# Patient Record
Sex: MALE | Race: WHITE | NOT HISPANIC OR LATINO | Employment: OTHER | URBAN - METROPOLITAN AREA
[De-identification: names, ages, dates, MRNs, and addresses within clinical notes are randomized per-mention and may not be internally consistent; named-entity substitution may affect disease eponyms.]

---

## 2017-04-27 ENCOUNTER — HISTORICAL (OUTPATIENT)
Dept: INTENSIVE CARE | Facility: HOSPITAL | Age: 62
End: 2017-04-27

## 2017-06-22 ENCOUNTER — HISTORICAL (OUTPATIENT)
Dept: INTENSIVE CARE | Facility: HOSPITAL | Age: 62
End: 2017-06-22

## 2017-10-16 ENCOUNTER — HISTORICAL (OUTPATIENT)
Dept: RESPIRATORY THERAPY | Facility: HOSPITAL | Age: 62
End: 2017-10-16

## 2019-07-17 ENCOUNTER — HISTORICAL (OUTPATIENT)
Dept: ADMINISTRATIVE | Facility: HOSPITAL | Age: 64
End: 2019-07-17

## 2019-10-28 ENCOUNTER — HISTORICAL (OUTPATIENT)
Dept: LAB | Facility: HOSPITAL | Age: 64
End: 2019-10-28

## 2019-10-28 LAB
ALBUMIN SERPL-MCNC: 4.3 GM/DL (ref 3.4–5)
ALBUMIN/GLOB SERPL: 1.5 RATIO (ref 1.1–2)
ALP SERPL-CCNC: 59 UNIT/L (ref 50–136)
ALT SERPL-CCNC: 28 UNIT/L (ref 12–78)
AST SERPL-CCNC: 20 UNIT/L (ref 15–37)
BILIRUB SERPL-MCNC: 0.6 MG/DL (ref 0.2–1)
BILIRUBIN DIRECT+TOT PNL SERPL-MCNC: 0.1 MG/DL (ref 0–0.5)
BILIRUBIN DIRECT+TOT PNL SERPL-MCNC: 0.5 MG/DL (ref 0–0.8)
BUN SERPL-MCNC: 19 MG/DL (ref 7–18)
CALCIUM SERPL-MCNC: 9.9 MG/DL (ref 8.5–10.1)
CHLORIDE SERPL-SCNC: 106 MMOL/L (ref 98–107)
CHOLEST SERPL-MCNC: 145 MG/DL (ref 0–200)
CHOLEST/HDLC SERPL: 2.9 {RATIO} (ref 0–5)
CO2 SERPL-SCNC: 28 MMOL/L (ref 21–32)
CREAT SERPL-MCNC: 0.99 MG/DL (ref 0.7–1.3)
EST. AVERAGE GLUCOSE BLD GHB EST-MCNC: 137 MG/DL
GLOBULIN SER-MCNC: 2.9 GM/DL (ref 2.4–3.5)
GLUCOSE SERPL-MCNC: 132 MG/DL (ref 74–106)
HBA1C MFR BLD: 6.4 % (ref 4.2–6.3)
HDLC SERPL-MCNC: 50 MG/DL (ref 35–60)
LDLC SERPL CALC-MCNC: 55 MG/DL (ref 0–129)
POTASSIUM SERPL-SCNC: 4.5 MMOL/L (ref 3.5–5.1)
PROT SERPL-MCNC: 7.2 GM/DL (ref 6.4–8.2)
SODIUM SERPL-SCNC: 140 MMOL/L (ref 136–145)
TRIGL SERPL-MCNC: 200 MG/DL (ref 30–150)
VLDLC SERPL CALC-MCNC: 40 MG/DL

## 2020-01-10 ENCOUNTER — HISTORICAL (OUTPATIENT)
Dept: LAB | Facility: HOSPITAL | Age: 65
End: 2020-01-10

## 2020-01-10 LAB
EST. AVERAGE GLUCOSE BLD GHB EST-MCNC: 131 MG/DL
HBA1C MFR BLD: 6.2 % (ref 4.2–6.3)

## 2020-04-14 ENCOUNTER — HISTORICAL (OUTPATIENT)
Dept: LAB | Facility: HOSPITAL | Age: 65
End: 2020-04-14

## 2020-04-14 LAB
ABS NEUT (OLG): 8.83 X10(3)/MCL (ref 2.1–9.2)
ALBUMIN SERPL-MCNC: 4.4 GM/DL (ref 3.4–4.8)
ALBUMIN/GLOB SERPL: 1.4 RATIO (ref 1.1–2)
ALP SERPL-CCNC: 62 UNIT/L (ref 40–150)
ALT SERPL-CCNC: 25 UNIT/L (ref 0–55)
APPEARANCE, UA: CLEAR
AST SERPL-CCNC: 31 UNIT/L (ref 5–34)
BACTERIA SPEC CULT: ABNORMAL /HPF
BASOPHILS # BLD AUTO: 0.1 X10(3)/MCL (ref 0–0.2)
BASOPHILS NFR BLD AUTO: 1 %
BILIRUB SERPL-MCNC: 0.5 MG/DL
BILIRUB UR QL STRIP: NEGATIVE
BILIRUBIN DIRECT+TOT PNL SERPL-MCNC: 0.2 MG/DL (ref 0–0.5)
BILIRUBIN DIRECT+TOT PNL SERPL-MCNC: 0.3 MG/DL (ref 0–0.8)
BUN SERPL-MCNC: 17 MG/DL (ref 8.4–25.7)
CALCIUM SERPL-MCNC: 10 MG/DL (ref 8.8–10)
CHLORIDE SERPL-SCNC: 101 MMOL/L (ref 98–107)
CHOLEST SERPL-MCNC: 139 MG/DL
CHOLEST/HDLC SERPL: 3 {RATIO} (ref 0–5)
CO2 SERPL-SCNC: 28 MMOL/L (ref 23–31)
COLOR UR: YELLOW
CREAT SERPL-MCNC: 1.11 MG/DL (ref 0.73–1.18)
CREAT UR-MCNC: 149.8 MG/DL (ref 58–161)
EOSINOPHIL # BLD AUTO: 0.3 X10(3)/MCL (ref 0–0.9)
EOSINOPHIL NFR BLD AUTO: 2 %
ERYTHROCYTE [DISTWIDTH] IN BLOOD BY AUTOMATED COUNT: 12.6 % (ref 11.5–17)
EST. AVERAGE GLUCOSE BLD GHB EST-MCNC: 125.5 MG/DL
GLOBULIN SER-MCNC: 3.2 GM/DL (ref 2.4–3.5)
GLUCOSE (UA): NEGATIVE
GLUCOSE SERPL-MCNC: 118 MG/DL (ref 82–115)
HBA1C MFR BLD: 6 %
HCT VFR BLD AUTO: 47 % (ref 42–52)
HDLC SERPL-MCNC: 50 MG/DL (ref 35–60)
HGB BLD-MCNC: 15.8 GM/DL (ref 14–18)
HGB UR QL STRIP: NEGATIVE
KETONES UR QL STRIP: ABNORMAL
LDLC SERPL CALC-MCNC: 53 MG/DL (ref 50–140)
LEUKOCYTE ESTERASE UR QL STRIP: NEGATIVE
LYMPHOCYTES # BLD AUTO: 3.2 X10(3)/MCL (ref 0.6–4.6)
LYMPHOCYTES NFR BLD AUTO: 23 %
MCH RBC QN AUTO: 31.6 PG (ref 27–31)
MCHC RBC AUTO-ENTMCNC: 33.6 GM/DL (ref 33–36)
MCV RBC AUTO: 94 FL (ref 80–94)
MICROALBUMIN UR-MCNC: 22.2 UG/ML
MICROALBUMIN/CREAT RATIO PNL UR: 14.8 MG/GM CR (ref 0–30)
MONOCYTES # BLD AUTO: 1 X10(3)/MCL (ref 0.1–1.3)
MONOCYTES NFR BLD AUTO: 8 %
NEUTROPHILS # BLD AUTO: 8.83 X10(3)/MCL (ref 2.1–9.2)
NEUTROPHILS NFR BLD AUTO: 66 %
NITRITE UR QL STRIP: NEGATIVE
PH UR STRIP: 5.5 [PH] (ref 5–9)
PLATELET # BLD AUTO: 273 X10(3)/MCL (ref 130–400)
PMV BLD AUTO: 10.1 FL (ref 9.4–12.4)
POTASSIUM SERPL-SCNC: 4 MMOL/L (ref 3.5–5.1)
PROT SERPL-MCNC: 7.6 GM/DL (ref 5.8–7.6)
PROT UR QL STRIP: NEGATIVE
PSA SERPL-MCNC: 4.56 NG/ML
RBC # BLD AUTO: 5 X10(6)/MCL (ref 4.7–6.1)
RBC #/AREA URNS HPF: ABNORMAL /[HPF]
SODIUM SERPL-SCNC: 141 MMOL/L (ref 136–145)
SP GR UR STRIP: 1.02 (ref 1–1.03)
SQUAMOUS EPITHELIAL, UA: ABNORMAL
TRIGL SERPL-MCNC: 180 MG/DL (ref 34–140)
UROBILINOGEN UR STRIP-ACNC: 0.2
VLDLC SERPL CALC-MCNC: 36 MG/DL
WBC # SPEC AUTO: 13.5 X10(3)/MCL (ref 4.5–11.5)
WBC #/AREA URNS HPF: ABNORMAL /HPF

## 2020-08-17 ENCOUNTER — HISTORICAL (OUTPATIENT)
Dept: ADMINISTRATIVE | Facility: HOSPITAL | Age: 65
End: 2020-08-17

## 2020-08-17 LAB
ALBUMIN SERPL-MCNC: 4.4 GM/DL (ref 3.4–5)
ALBUMIN/GLOB SERPL: 1.6 RATIO (ref 1.1–2)
ALP SERPL-CCNC: 57 UNIT/L (ref 40–150)
ALT SERPL-CCNC: 23 UNIT/L (ref 0–55)
AST SERPL-CCNC: 21 UNIT/L (ref 5–34)
BILIRUB SERPL-MCNC: 0.6 MG/DL
BILIRUBIN DIRECT+TOT PNL SERPL-MCNC: 0.3 MG/DL (ref 0–0.5)
BILIRUBIN DIRECT+TOT PNL SERPL-MCNC: 0.3 MG/DL (ref 0–0.8)
BUN SERPL-MCNC: 17.1 MG/DL (ref 8.4–25.7)
CALCIUM SERPL-MCNC: 8.9 MG/DL (ref 8.8–10)
CHLORIDE SERPL-SCNC: 99 MMOL/L (ref 98–107)
CHOLEST SERPL-MCNC: 139 MG/DL
CHOLEST/HDLC SERPL: 2 {RATIO} (ref 0–5)
CO2 SERPL-SCNC: 28 MMOL/L (ref 23–31)
CREAT SERPL-MCNC: 1.4 MG/DL (ref 0.73–1.18)
EST. AVERAGE GLUCOSE BLD GHB EST-MCNC: 131.2 MG/DL
GLOBULIN SER-MCNC: 2.8 GM/DL (ref 2.4–3.5)
GLUCOSE SERPL-MCNC: 104 MG/DL (ref 82–115)
HBA1C MFR BLD: 6.2 %
HDLC SERPL-MCNC: 56 MG/DL (ref 35–60)
LDLC SERPL CALC-MCNC: 60 MG/DL (ref 50–140)
POTASSIUM SERPL-SCNC: 5.1 MMOL/L (ref 3.5–5.1)
PROT SERPL-MCNC: 7.2 GM/DL (ref 5.8–7.6)
SODIUM SERPL-SCNC: 140 MMOL/L (ref 136–145)
TRIGL SERPL-MCNC: 114 MG/DL (ref 34–140)
VLDLC SERPL CALC-MCNC: 23 MG/DL

## 2021-04-20 ENCOUNTER — HISTORICAL (OUTPATIENT)
Dept: ADMINISTRATIVE | Facility: HOSPITAL | Age: 66
End: 2021-04-20

## 2021-04-20 LAB
ABS NEUT (OLG): 8.93 X10(3)/MCL (ref 2.1–9.2)
ALBUMIN SERPL-MCNC: 4.3 GM/DL (ref 3.4–4.8)
ALBUMIN/GLOB SERPL: 1.7 RATIO (ref 1.1–2)
ALP SERPL-CCNC: 78 UNIT/L (ref 40–150)
ALT SERPL-CCNC: 11 UNIT/L (ref 0–55)
APPEARANCE, UA: CLEAR
AST SERPL-CCNC: 15 UNIT/L (ref 5–34)
BACTERIA #/AREA URNS AUTO: ABNORMAL /HPF
BASOPHILS # BLD AUTO: 0.1 X10(3)/MCL (ref 0–0.2)
BASOPHILS NFR BLD AUTO: 0 %
BILIRUB SERPL-MCNC: 0.6 MG/DL
BILIRUB UR QL STRIP: NEGATIVE
BILIRUBIN DIRECT+TOT PNL SERPL-MCNC: 0.3 MG/DL (ref 0–0.5)
BILIRUBIN DIRECT+TOT PNL SERPL-MCNC: 0.3 MG/DL (ref 0–0.8)
BUN SERPL-MCNC: 12.4 MG/DL (ref 8.4–25.7)
CALCIUM SERPL-MCNC: 9.8 MG/DL (ref 8.8–10)
CHLORIDE SERPL-SCNC: 102 MMOL/L (ref 98–107)
CHOLEST SERPL-MCNC: 161 MG/DL
CHOLEST/HDLC SERPL: 3 {RATIO} (ref 0–5)
CO2 SERPL-SCNC: 29 MMOL/L (ref 23–31)
COLOR UR: YELLOW
CREAT SERPL-MCNC: 1.13 MG/DL (ref 0.73–1.18)
CREAT UR-MCNC: 205.2 MG/DL (ref 58–161)
EOSINOPHIL # BLD AUTO: 0.1 X10(3)/MCL (ref 0–0.9)
EOSINOPHIL NFR BLD AUTO: 1 %
ERYTHROCYTE [DISTWIDTH] IN BLOOD BY AUTOMATED COUNT: 12.9 % (ref 11.5–17)
EST. AVERAGE GLUCOSE BLD GHB EST-MCNC: 122.6 MG/DL
GLOBULIN SER-MCNC: 2.6 GM/DL (ref 2.4–3.5)
GLUCOSE (UA): NEGATIVE
GLUCOSE SERPL-MCNC: 85 MG/DL (ref 82–115)
HBA1C MFR BLD: 5.9 %
HCT VFR BLD AUTO: 47.5 % (ref 42–52)
HDLC SERPL-MCNC: 55 MG/DL (ref 35–60)
HGB BLD-MCNC: 16.4 GM/DL (ref 14–18)
HGB UR QL STRIP: NEGATIVE
KETONES UR QL STRIP: ABNORMAL
LDLC SERPL CALC-MCNC: 87 MG/DL (ref 50–140)
LEUKOCYTE ESTERASE UR QL STRIP: NEGATIVE
LYMPHOCYTES # BLD AUTO: 1.9 X10(3)/MCL (ref 0.6–4.6)
LYMPHOCYTES NFR BLD AUTO: 16 %
MCH RBC QN AUTO: 31.2 PG (ref 27–31)
MCHC RBC AUTO-ENTMCNC: 34.5 GM/DL (ref 33–36)
MCV RBC AUTO: 90.5 FL (ref 80–94)
MICROALBUMIN UR-MCNC: 241.4 UG/ML
MICROALBUMIN/CREAT RATIO PNL UR: 117.6 MG/GM CR (ref 0–30)
MONOCYTES # BLD AUTO: 0.9 X10(3)/MCL (ref 0.1–1.3)
MONOCYTES NFR BLD AUTO: 7 %
NEUTROPHILS # BLD AUTO: 8.93 X10(3)/MCL (ref 2.1–9.2)
NEUTROPHILS NFR BLD AUTO: 75 %
NITRITE UR QL STRIP.AUTO: NEGATIVE
PH UR STRIP: 5 [PH] (ref 5–9)
PLATELET # BLD AUTO: 267 X10(3)/MCL (ref 130–400)
PMV BLD AUTO: 9.4 FL (ref 9.4–12.4)
POTASSIUM SERPL-SCNC: 4.2 MMOL/L (ref 3.5–5.1)
PROT SERPL-MCNC: 6.9 GM/DL (ref 5.8–7.6)
PROT UR QL STRIP: ABNORMAL
PSA SERPL-MCNC: 9.58 NG/ML
RBC # BLD AUTO: 5.25 X10(6)/MCL (ref 4.7–6.1)
RBC #/AREA URNS HPF: ABNORMAL /[HPF]
SODIUM SERPL-SCNC: 140 MMOL/L (ref 136–145)
SP GR UR STRIP: 1.02 (ref 1–1.03)
SQUAMOUS EPITHELIAL, UA: ABNORMAL /HPF (ref 0–4)
TRIGL SERPL-MCNC: 94 MG/DL (ref 34–140)
UROBILINOGEN UR STRIP-ACNC: 0.2
VLDLC SERPL CALC-MCNC: 19 MG/DL
WBC # SPEC AUTO: 12 X10(3)/MCL (ref 4.5–11.5)
WBC #/AREA URNS AUTO: ABNORMAL /HPF (ref 0–3)

## 2021-07-21 ENCOUNTER — HISTORICAL (OUTPATIENT)
Dept: ADMINISTRATIVE | Facility: HOSPITAL | Age: 66
End: 2021-07-21

## 2021-07-21 LAB
EST. AVERAGE GLUCOSE BLD GHB EST-MCNC: 134.1 MG/DL
HBA1C MFR BLD: 6.3 %

## 2021-10-20 ENCOUNTER — HISTORICAL (OUTPATIENT)
Dept: ADMINISTRATIVE | Facility: HOSPITAL | Age: 66
End: 2021-10-20

## 2021-10-20 LAB
EST. AVERAGE GLUCOSE BLD GHB EST-MCNC: 151.3 MG/DL
HBA1C MFR BLD: 6.9 %

## 2021-10-25 ENCOUNTER — HISTORICAL (OUTPATIENT)
Dept: ADMINISTRATIVE | Facility: HOSPITAL | Age: 66
End: 2021-10-25

## 2021-10-25 LAB — POC CREATININE: 1 MG/DL (ref 0.6–1.3)

## 2022-01-24 ENCOUNTER — HISTORICAL (OUTPATIENT)
Dept: LAB | Facility: HOSPITAL | Age: 67
End: 2022-01-24

## 2022-01-24 LAB
ALBUMIN SERPL-MCNC: 4.2 GM/DL (ref 3.4–4.8)
ALBUMIN/GLOB SERPL: 1.6 RATIO (ref 1.1–2)
ALP SERPL-CCNC: 91 UNIT/L (ref 40–150)
ALT SERPL-CCNC: 15 UNIT/L (ref 0–55)
AST SERPL-CCNC: 9 UNIT/L (ref 5–34)
BILIRUB SERPL-MCNC: 0.6 MG/DL
BILIRUBIN DIRECT+TOT PNL SERPL-MCNC: 0.2 MG/DL (ref 0–0.5)
BILIRUBIN DIRECT+TOT PNL SERPL-MCNC: 0.4 MG/DL (ref 0–0.8)
BUN SERPL-MCNC: 13 MG/DL (ref 8.4–25.7)
CALCIUM SERPL-MCNC: 9.7 MG/DL (ref 8.7–10.5)
CHLORIDE SERPL-SCNC: 98 MMOL/L (ref 98–107)
CHOLEST SERPL-MCNC: 202 MG/DL
CHOLEST/HDLC SERPL: 4 {RATIO} (ref 0–5)
CO2 SERPL-SCNC: 28 MMOL/L (ref 23–31)
CREAT SERPL-MCNC: 1.04 MG/DL (ref 0.73–1.18)
EST. AVERAGE GLUCOSE BLD GHB EST-MCNC: 231.7 MG/DL
GLOBULIN SER-MCNC: 2.7 GM/DL (ref 2.4–3.5)
GLUCOSE SERPL-MCNC: 307 MG/DL (ref 82–115)
HBA1C MFR BLD: 9.7 %
HDLC SERPL-MCNC: 47 MG/DL (ref 35–60)
LDLC SERPL CALC-MCNC: 113 MG/DL (ref 50–140)
POTASSIUM SERPL-SCNC: 3.9 MMOL/L (ref 3.5–5.1)
PROT SERPL-MCNC: 6.9 GM/DL (ref 5.8–7.6)
SODIUM SERPL-SCNC: 138 MMOL/L (ref 136–145)
TRIGL SERPL-MCNC: 210 MG/DL (ref 34–140)
VLDLC SERPL CALC-MCNC: 42 MG/DL

## 2022-04-10 ENCOUNTER — HISTORICAL (OUTPATIENT)
Dept: ADMINISTRATIVE | Facility: HOSPITAL | Age: 67
End: 2022-04-10
Payer: COMMERCIAL

## 2022-04-24 VITALS
BODY MASS INDEX: 25.43 KG/M2 | SYSTOLIC BLOOD PRESSURE: 132 MMHG | DIASTOLIC BLOOD PRESSURE: 82 MMHG | HEIGHT: 67 IN | WEIGHT: 162 LBS | OXYGEN SATURATION: 97 %

## 2022-04-25 ENCOUNTER — HISTORICAL (OUTPATIENT)
Dept: LAB | Facility: HOSPITAL | Age: 67
End: 2022-04-25
Payer: COMMERCIAL

## 2022-04-25 LAB
ABS NEUT (OLG): 8.36 (ref 2.1–9.2)
ALBUMIN SERPL-MCNC: 4.3 G/DL (ref 3.4–4.8)
ALBUMIN/GLOB SERPL: 1.5 {RATIO} (ref 1.1–2)
ALP SERPL-CCNC: 84 U/L (ref 40–150)
ALT SERPL-CCNC: 16 U/L (ref 0–55)
APPEARANCE, UA: CLEAR
AST SERPL-CCNC: 13 U/L (ref 5–34)
BACTERIA SPEC CULT: NORMAL
BASOPHILS # BLD AUTO: 0.1 10*3/UL (ref 0–0.2)
BASOPHILS NFR BLD AUTO: 1 %
BILIRUB SERPL-MCNC: 0.6 MG/DL
BILIRUB UR QL STRIP: NEGATIVE
BILIRUBIN DIRECT+TOT PNL SERPL-MCNC: 0.2 (ref 0–0.5)
BILIRUBIN DIRECT+TOT PNL SERPL-MCNC: 0.4 (ref 0–0.8)
BUDDING YEAST: NORMAL
BUN SERPL-MCNC: 13.1 MG/DL (ref 8.4–25.7)
CALCIUM SERPL-MCNC: 9.5 MG/DL (ref 8.7–10.5)
CASTS, UA: NORMAL
CHLORIDE SERPL-SCNC: 103 MMOL/L (ref 98–107)
CHOLEST SERPL-MCNC: 176 MG/DL
CHOLEST/HDLC SERPL: 5 {RATIO} (ref 0–5)
CO2 SERPL-SCNC: 26 MMOL/L (ref 23–31)
COLOR UR: NORMAL
CREAT SERPL-MCNC: 1.14 MG/DL (ref 0.73–1.18)
CREAT UR-MCNC: 224.8 MG/DL (ref 58–161)
CRYSTALS: NORMAL
EOSINOPHIL # BLD AUTO: 0.2 10*3/UL (ref 0–0.9)
EOSINOPHIL NFR BLD AUTO: 1 %
ERYTHROCYTE [DISTWIDTH] IN BLOOD BY AUTOMATED COUNT: 12.8 % (ref 11.5–17)
EST. AVERAGE GLUCOSE BLD GHB EST-MCNC: 217.3 MG/DL
GLOBULIN SER-MCNC: 2.8 G/DL (ref 2.4–3.5)
GLUCOSE (UA): NEGATIVE
GLUCOSE SERPL-MCNC: 201 MG/DL (ref 82–115)
HBA1C MFR BLD: 9.2 %
HCT VFR BLD AUTO: 47.2 % (ref 42–52)
HDLC SERPL-MCNC: 38 MG/DL (ref 35–60)
HEMOLYSIS INTERF INDEX SERPL-ACNC: 5
HGB BLD-MCNC: 15.7 G/DL (ref 14–18)
HGB UR QL STRIP: NEGATIVE
ICTERIC INTERF INDEX SERPL-ACNC: 1
KETONES UR QL STRIP: NORMAL
LDLC SERPL CALC-MCNC: 99 MG/DL (ref 50–140)
LEUKOCYTE ESTERASE UR QL STRIP: NORMAL
LIPEMIC INTERF INDEX SERPL-ACNC: 4
LYMPHOCYTES # BLD AUTO: 2.7 10*3/UL (ref 0.6–4.6)
LYMPHOCYTES NFR BLD AUTO: 21 %
MANUAL DIFF? (OHS): NO
MCH RBC QN AUTO: 30 PG (ref 27–31)
MCHC RBC AUTO-ENTMCNC: 33.3 G/DL (ref 33–36)
MCV RBC AUTO: 90.1 FL (ref 80–94)
MICROALBUMIN UR-MCNC: 121.2
MICROALBUMIN/CREAT RATIO PNL UR: 53.9 (ref 0–30)
MONOCYTES # BLD AUTO: 1 10*3/UL (ref 0.1–1.3)
MONOCYTES NFR BLD AUTO: 8 %
NEUTROPHILS # BLD AUTO: 8.36 10*3/UL (ref 2.1–9.2)
NEUTROPHILS NFR BLD AUTO: 67 %
NITRITE UR QL STRIP: NEGATIVE
PH UR STRIP: 5 [PH] (ref 5–9)
PLATELET # BLD AUTO: 349 10*3/UL (ref 130–400)
PMV BLD AUTO: 11 FL (ref 9.4–12.4)
POTASSIUM SERPL-SCNC: 4.1 MMOL/L (ref 3.5–5.1)
PROT SERPL-MCNC: 7.1 G/DL (ref 5.8–7.6)
PROT UR QL STRIP: NORMAL
PSA SERPL-MCNC: 6.03 NG/ML
RBC # BLD AUTO: 5.24 10*6/UL (ref 4.7–6.1)
RBC #/AREA URNS HPF: NORMAL /[HPF] (ref 0–2)
SMALL ROUND CELLS, UA: NORMAL
SODIUM SERPL-SCNC: 140 MMOL/L (ref 136–145)
SP GR UR STRIP: 1.02 (ref 1–1.03)
SPERM URNS QL MICRO: NORMAL
SQUAMOUS EPITHELIAL, UA: NORMAL (ref 0–4)
TRIGL SERPL-MCNC: 195 MG/DL (ref 34–140)
UROBILINOGEN UR STRIP-ACNC: 1
VLDLC SERPL CALC-MCNC: 39 MG/DL
WBC # SPEC AUTO: 12.4 10*3/UL (ref 4.5–11.5)
WBC #/AREA URNS HPF: NORMAL /[HPF] (ref 0–2)

## 2022-05-03 NOTE — HISTORICAL [facility]ER
This is a historical note converted from Ailyn. Formatting and pictures may have been removed.  Please reference Ailyn for original formatting and attached multimedia. Chief Complaint  Pt is here for right knee pain and bilateral hip pain. Pt stated he has tried therapy and it hasnt helped.  History of Present Illness  Erwin is a 63-year-old male that comes in today for evaluation of his bilateral hip pain and right knee pain.? Main reason for this visit?was to evaluate his hips. ?He has a long-standing history of bilateral hip pain. ?This is been going on for?9 to 10 years.? He has previously seen?another orthopedist and was diagnosed with avascular necrosis of both hips. ?He was treated with conservative measures such as therapy and medications.? Currently?his pain is?in the groin and worse with weightbearing.? He has no pain at rest or sitting.? He denies any history of injury.? He is also complaining of right knee pain. ?This is been present for 1 month. ?No history of injury. ?His pain is medial sided?and worse with bending going up and down stairs. ?He does admit to occasional?locking episodes. ?He denies any catching or giving way episodes.? Intermittent swelling noted as well.  Review of Systems  Systemic: No fever, no chills, and no recent weight change.  Head: No headache - frequent.  Eyes: No vision problems.  Otolarnygeal: No hearing loss, no earache, no epistaxis, no hoarseness, and no tooth pain. Gums normal.  Cardiovascular: No chest pain or discomfort and no palpitations.  Pulmonary: No pulmonary symptoms - no dyspnea, no shortness of breath  Gastrointestinal: Appetite not decreased. No dysphagia and no constant eructation. No nausea, no vomiting, no abdominal pain, no hematochezia.  Genitourinary: No genitourinary symptoms - No urinary hesitancy. No urinary loss of control - no burning sensation during urination.  Musculoskeletal: No calf muscle cramps and no localized soft tissue  swelling  Neurological: No fainting and no convulsions.  Psychological: no depression.  Skin: No rash.  Physical Exam  Vitals & Measurements  T:?36.5? ?C (Oral)? HR:?86(Peripheral)? BP:?132/68?  HT:?170?cm? WT:?73.48?kg? BMI:?25.43?  Musculoskeletal System:  right?Knee:  Knee Effusion Grade:??????????????Value????Normal Range  Grade effusion?????????????? 0  active flexion????????? 125  active extension?????0  ?Anteromedial aspect was tender on palpation.??Medial aspect was tender on palpation.??Medial collateral ligament was tender on palpation.  No lateral compartment tenderness?  _?Positive medial?Elli test  _negative lachman test  No instability on varus or valgus stress  Foot:  right?Foot: ??No excessive pronation. ??No excessive supination.  Neurological:  ??Oriented to time, place, and person.  Motor (Strength): ??No weakness of the?right knee was observed. Gait And Stance:??Limping was noted on the right.??  Skin:  ??no erythema, ecchymosis or increased heat  ?   right?knee radiographs taken in the office today show no osteoarticular abnormalities.?  ?   right hip exam  No signs of edema, erythema, or induration. No muscle atrophy seen. Tenderness to palpation over the greater trochanteric region. No tenderness over the sacroiliac joint.  Pain was elicited by active internal rotation with the hip extended. Pain was elicited by active external rotation with the hip extended. Pain was elicited by active internal rotation with the hip flexed. Pain was elicited by active external rotation with the hip flexed. Pain was elicited by active motion. Pain was elicited by passive motion. Hip was not dislocated or subluxed. No instability or laxity seen.  Passive hip abduction?30 degrees  Passive hip flexion 100 degrees  Passive internal rotation 10 degrees  Passive external rotation 30 degrees  Neurological:  Motor (Motor Strength): No weakness of the?right hip was observed.  Gait And Stance: Abnormal. An antalgic  gait was observed. limping was noted on the right.  ?   Tests  Imaging:  right?hip x-ray with two or more views of the AP and lateral aspects were performed.  Impressions  increased sclerosis and femoral head cyst seen consistent with AVN.? No femoral head collapse. no fracture or deformities seen  ?  left?hip exam  No signs of edema, erythema, or induration. No muscle atrophy seen. Tenderness to palpation over the greater trochanteric region. No tenderness over the sacroiliac joint.  Pain was elicited by active internal rotation with the hip extended. Pain was elicited by active external rotation with the hip extended. Pain was elicited by active internal rotation with the hip flexed. Pain was elicited by active external rotation with the hip flexed. Pain was elicited by active motion. Pain was elicited by passive motion. Hip was not dislocated or subluxed. No instability or laxity seen.  Passive hip abduction?30 degrees  Passive hip flexion 100 degrees  Passive internal rotation 10 degrees  Passive external rotation 30 degrees  Neurological:  Motor (Motor Strength): No weakness of the left hip was observed.  Gait And Stance: Abnormal. An antalgic gait was observed.  ?   Tests  Imaging:  left?hip x-ray with two or more views of the AP and lateral aspects were performed.  Impressions  increased sclerosis and femoral head cyst seen consistent with AVN.? No femoral head collapse. no fracture or deformities seen  Assessment/Plan  1.?Acute medial meniscus tear of right knee?S83.241A  ?Recommend MRI of the right knee to evaluate for displaced medial meniscus tear.  Ordered:  Clinic Follow up, *Est. 07/31/19 3:00:00 CDT, Order for future visit, Acute medial meniscus tear of right knee  Avascular necrosis of right femoral head  Avascular necrosis of left femoral head, [facility]  ?  2.?Avascular necrosis of right femoral head?M87.051  ?With his history of avascular necrosis recommend MRI?of the right and left hip to  evaluate degree of femoral head?AVN.? We will see him back in?1 to 2 weeks time to discuss test results  Ordered:  Clinic Follow up, *Est. 07/31/19 3:00:00 CDT, Order for future visit, Acute medial meniscus tear of right knee  Avascular necrosis of right femoral head  Avascular necrosis of left femoral head, [facility]  ?  3.?Avascular necrosis of left femoral head?M87.052  ?MRI of the left hip  Ordered:  Clinic Follow up, *Est. 07/31/19 3:00:00 CDT, Order for future visit, Acute medial meniscus tear of right knee  Avascular necrosis of right femoral head  Avascular necrosis of left femoral head, [facility]  ?  Referrals  Clinic Follow up, *Est. 07/31/19 3:00:00 CDT, Order for future visit, Acute medial meniscus tear of right knee  Avascular necrosis of right femoral head  Avascular necrosis of left femoral head, [facility]   Problem List/Past Medical History  Ongoing  Acid reflux  Acute medial meniscus tear of right knee  Anxiety  Avascular necrosis of right femoral head  Cigarette smoker  Diabetes  Hyperlipidemia  Nocturnal hypoxemia  NEAL (obstructive sleep apnea)  Tobacco user  Historical  No qualifying data  Procedure/Surgical History  Monitoring of Sleep, External Approach (06/22/2017)  Sleep study, unattended, simultaneous recording of, heart rate, oxygen saturation, respiratory airflow, and respiratory effort (eg, thoracoabdominal movement) (06/22/2017)  Monitoring of Sleep, External Approach (04/27/2017)  Sleep study, unattended, simultaneous recording of, heart rate, oxygen saturation, respiratory airflow, and respiratory effort (eg, thoracoabdominal movement) (04/27/2017)  Abdominal hernia  Tonsillectomy   Medications  aspirin 81 mg oral tablet, 81 mg= 1 tab(s), Oral, Daily  Cialis 5 mg oral tablet, 5 mg= 1 tab(s), Oral, Daily, PRN  fenofibrate 54 mg oral tablet, 54 mg= 1 tab(s), Oral, Daily  lisinopril 10 mg oral tablet, 10 mg= 1 tab(s), Oral, Daily  metformin 500 mg oral tablet, 500 mg=  1 tab(s), Oral, BID  Protonix 40 mg ORAL enteric coated tablet, 40 mg= 1 tab(s), Oral, BID  rosuvastatin 20 mg oral tablet, 20 mg= 1 tab(s), Oral, qPM  Xanax 1 mg oral tablet, 1 mg= 1 tab(s), Oral, BID  Zetia 10 mg oral tablet, 10 mg= 1 tab(s), Oral, Daily  Allergies  No Known Allergies  No Known Medication Allergies  Social History  Abuse/Neglect  No, 07/17/2019  Alcohol  Current, 3-5 times per week, 05/30/2017  Employment/School  Employed, Work/School description: [employer] 05/30/2017  Home/Environment  Lives with Spouse., 05/30/2017  Substance Use  Never, 05/13/2018  Tobacco  10 or more cigarettes (1/2 pack or more)/day in last 30 days, N/A, 07/17/2019  Family History  DIABETES: Father.  Hypertension.: Mother.  Health Maintenance  Health Maintenance  ???Pending?(in the next year)  ??? ??OverDue  ??? ? ? ?Diabetes Screening due??and every?  ??? ? ? ?Aspirin Therapy for CVD Prevention due??04/04/18??and every 1??year(s)  ??? ? ? ?Alcohol Misuse Screening due??01/01/19??and every 1??year(s)  ??? ? ? ?Smoking Cessation due??01/01/19??and every 1??year(s)  ??? ??Due?  ??? ? ? ?ADL Screening due??07/17/19??and every 1??year(s)  ??? ? ? ?Colorectal Screening due??07/17/19??and every?  ??? ? ? ?Diabetes Maintenance-Eye Exam due??07/17/19??and every?  ??? ? ? ?Diabetes Maintenance-Foot Exam due??07/17/19??and every?  ??? ? ? ?Diabetes Maintenance-HgbA1c due??07/17/19??and every?  ??? ? ? ?Diabetes Maintenance-Fasting Lipid Profile due??07/17/19??and every?  ??? ? ? ?Lung Cancer Screening due??07/17/19??and every 1??year(s)  ??? ? ? ?Tetanus Vaccine due??07/17/19??and every 10??year(s)  ??? ? ? ?Zoster Vaccine due??07/17/19??and every 100??year(s)  ??? ??Due In Future?  ??? ? ? ?Blood Pressure Screening not due until??08/09/19??and every 1??year(s)  ??? ? ? ?Body Mass Index Check not due until??08/09/19??and every 1??year(s)  ??? ? ? ?Depression Screening not due until??08/09/19??and every 1??year(s)  ??? ? ? ?Obesity  Screening not due until??01/01/20??and every 1??year(s)  ???Satisfied?(in the past 1 year)  ??? ??Satisfied?  ??? ? ? ?Blood Pressure Screening on??07/17/19.??Satisfied by Edie Ritter LPN  ??? ? ? ?Body Mass Index Check on??07/17/19.??Satisfied by Edie Ritter LPN  ??? ? ? ?Depression Screening on??08/09/18.??Satisfied by Trista Parr  ??? ? ? ?Obesity Screening on??07/17/19.??Satisfied by Edie Ritter LPN  ??? ? ? ?Smoking Cessation on??08/09/18.??Satisfied by Trista Parr  ?

## 2022-07-01 ENCOUNTER — APPOINTMENT (OUTPATIENT)
Dept: LAB | Facility: HOSPITAL | Age: 67
End: 2022-07-01
Attending: NURSE PRACTITIONER
Payer: COMMERCIAL

## 2022-07-01 DIAGNOSIS — E11.9 DIABETES MELLITUS WITHOUT COMPLICATION: Primary | ICD-10-CM

## 2022-07-01 LAB
EST. AVERAGE GLUCOSE BLD GHB EST-MCNC: 171.4 MG/DL
HBA1C MFR BLD: 7.6 %

## 2022-07-01 PROCEDURE — 36415 COLL VENOUS BLD VENIPUNCTURE: CPT

## 2022-07-01 PROCEDURE — 83036 HEMOGLOBIN GLYCOSYLATED A1C: CPT

## 2022-10-05 ENCOUNTER — LAB REQUISITION (OUTPATIENT)
Dept: LAB | Facility: HOSPITAL | Age: 67
End: 2022-10-05
Payer: COMMERCIAL

## 2022-10-05 DIAGNOSIS — E11.9 TYPE 2 DIABETES MELLITUS WITHOUT COMPLICATIONS: ICD-10-CM

## 2022-10-05 DIAGNOSIS — E78.2 MIXED HYPERLIPIDEMIA: ICD-10-CM

## 2022-10-05 LAB
ALBUMIN SERPL-MCNC: 4 GM/DL (ref 3.4–4.8)
ALBUMIN/GLOB SERPL: 1.4 RATIO (ref 1.1–2)
ALP SERPL-CCNC: 55 UNIT/L (ref 40–150)
ALT SERPL-CCNC: 8 UNIT/L (ref 0–55)
AST SERPL-CCNC: 10 UNIT/L (ref 5–34)
BILIRUBIN DIRECT+TOT PNL SERPL-MCNC: 0.2 MG/DL
BUN SERPL-MCNC: 13.9 MG/DL (ref 8.4–25.7)
CALCIUM SERPL-MCNC: 9.3 MG/DL (ref 8.8–10)
CHLORIDE SERPL-SCNC: 104 MMOL/L (ref 98–107)
CHOLEST SERPL-MCNC: 149 MG/DL
CHOLEST/HDLC SERPL: 4 {RATIO} (ref 0–5)
CO2 SERPL-SCNC: 32 MMOL/L (ref 23–31)
CREAT SERPL-MCNC: 1.08 MG/DL (ref 0.73–1.18)
EST. AVERAGE GLUCOSE BLD GHB EST-MCNC: 159.9 MG/DL
GFR SERPLBLD CREATININE-BSD FMLA CKD-EPI: >60 MLS/MIN/1.73/M2
GLOBULIN SER-MCNC: 2.9 GM/DL (ref 2.4–3.5)
GLUCOSE SERPL-MCNC: 179 MG/DL (ref 82–115)
HBA1C MFR BLD: 7.2 %
HDLC SERPL-MCNC: 34 MG/DL (ref 35–60)
LDLC SERPL CALC-MCNC: 72 MG/DL (ref 50–140)
POTASSIUM SERPL-SCNC: 4.2 MMOL/L (ref 3.5–5.1)
PROT SERPL-MCNC: 6.9 GM/DL (ref 5.8–7.6)
SODIUM SERPL-SCNC: 141 MMOL/L (ref 136–145)
TRIGL SERPL-MCNC: 213 MG/DL (ref 34–140)
VLDLC SERPL CALC-MCNC: 43 MG/DL

## 2022-10-05 PROCEDURE — 83036 HEMOGLOBIN GLYCOSYLATED A1C: CPT | Performed by: NURSE PRACTITIONER

## 2022-10-05 PROCEDURE — 80061 LIPID PANEL: CPT | Performed by: NURSE PRACTITIONER

## 2022-10-05 PROCEDURE — 80053 COMPREHEN METABOLIC PANEL: CPT | Performed by: NURSE PRACTITIONER

## 2023-01-03 DIAGNOSIS — I63.9 CEREBELLAR INFARCTION: Primary | ICD-10-CM

## 2023-01-17 RX ORDER — CARVEDILOL 6.25 MG/1
6.25 TABLET ORAL 2 TIMES DAILY WITH MEALS
COMMUNITY

## 2023-01-17 RX ORDER — ROSUVASTATIN CALCIUM 40 MG/1
10 TABLET, COATED ORAL NIGHTLY
COMMUNITY

## 2023-01-17 RX ORDER — BUPROPION HYDROCHLORIDE 150 MG/1
150 TABLET ORAL DAILY
COMMUNITY

## 2023-01-17 RX ORDER — PANTOPRAZOLE SODIUM 40 MG/1
40 TABLET, DELAYED RELEASE ORAL EVERY MORNING
COMMUNITY

## 2023-01-17 RX ORDER — FENOFIBRATE 48 MG/1
48 TABLET, FILM COATED ORAL
COMMUNITY

## 2023-01-17 RX ORDER — BUSPIRONE HYDROCHLORIDE 15 MG/1
15 TABLET ORAL 2 TIMES DAILY
COMMUNITY

## 2023-01-17 RX ORDER — PRIMIDONE 50 MG/1
50 TABLET ORAL 2 TIMES DAILY
COMMUNITY

## 2023-01-17 RX ORDER — QUETIAPINE FUMARATE 50 MG/1
50 TABLET, FILM COATED ORAL NIGHTLY
COMMUNITY

## 2023-01-17 RX ORDER — VENLAFAXINE HYDROCHLORIDE 150 MG/1
150 CAPSULE, EXTENDED RELEASE ORAL DAILY
COMMUNITY

## 2023-01-17 RX ORDER — ALPRAZOLAM 1 MG/1
1 TABLET ORAL 2 TIMES DAILY
COMMUNITY

## 2023-01-17 RX ORDER — METFORMIN HYDROCHLORIDE 1000 MG/1
1000 TABLET ORAL 2 TIMES DAILY WITH MEALS
COMMUNITY

## 2023-01-17 RX ORDER — AMLODIPINE BESYLATE 5 MG/1
5 TABLET ORAL DAILY
COMMUNITY
End: 2023-01-31 | Stop reason: DRUGHIGH

## 2023-01-30 ENCOUNTER — LAB REQUISITION (OUTPATIENT)
Dept: LAB | Facility: HOSPITAL | Age: 68
End: 2023-01-30
Payer: MEDICARE

## 2023-01-30 DIAGNOSIS — E11.9 TYPE 2 DIABETES MELLITUS WITHOUT COMPLICATIONS: ICD-10-CM

## 2023-01-30 LAB
EST. AVERAGE GLUCOSE BLD GHB EST-MCNC: 162.8 MG/DL
HBA1C MFR BLD: 7.3 %

## 2023-01-30 PROCEDURE — 83036 HEMOGLOBIN GLYCOSYLATED A1C: CPT | Performed by: INTERNAL MEDICINE

## 2023-01-31 ENCOUNTER — TELEPHONE (OUTPATIENT)
Dept: NEUROLOGY | Facility: CLINIC | Age: 68
End: 2023-01-31

## 2023-01-31 ENCOUNTER — OFFICE VISIT (OUTPATIENT)
Dept: NEUROLOGY | Facility: CLINIC | Age: 68
End: 2023-01-31
Payer: MEDICARE

## 2023-01-31 VITALS
BODY MASS INDEX: 27.32 KG/M2 | DIASTOLIC BLOOD PRESSURE: 80 MMHG | WEIGHT: 170 LBS | HEIGHT: 66 IN | SYSTOLIC BLOOD PRESSURE: 132 MMHG

## 2023-01-31 DIAGNOSIS — I63.89 OTHER CEREBRAL INFARCTION: Primary | ICD-10-CM

## 2023-01-31 DIAGNOSIS — I63.9 CEREBELLAR INFARCTION: ICD-10-CM

## 2023-01-31 PROCEDURE — 99999 PR PBB SHADOW E&M-EST. PATIENT-LVL III: ICD-10-PCS | Mod: PBBFAC,,, | Performed by: PSYCHIATRY & NEUROLOGY

## 2023-01-31 PROCEDURE — 99205 PR OFFICE/OUTPT VISIT, NEW, LEVL V, 60-74 MIN: ICD-10-PCS | Mod: S$PBB,,, | Performed by: PSYCHIATRY & NEUROLOGY

## 2023-01-31 PROCEDURE — 99999 PR PBB SHADOW E&M-EST. PATIENT-LVL III: CPT | Mod: PBBFAC,,, | Performed by: PSYCHIATRY & NEUROLOGY

## 2023-01-31 PROCEDURE — 99213 OFFICE O/P EST LOW 20 MIN: CPT | Mod: PBBFAC | Performed by: PSYCHIATRY & NEUROLOGY

## 2023-01-31 PROCEDURE — 99205 OFFICE O/P NEW HI 60 MIN: CPT | Mod: S$PBB,,, | Performed by: PSYCHIATRY & NEUROLOGY

## 2023-01-31 RX ORDER — ASPIRIN 81 MG/1
81 TABLET ORAL DAILY
COMMUNITY

## 2023-01-31 RX ORDER — AMLODIPINE BESYLATE 10 MG/1
10 TABLET ORAL
COMMUNITY
Start: 2023-01-25

## 2023-01-31 NOTE — PROGRESS NOTES
Neurology Office Visit  Neurology    Erwin JORGE Garnica is a 67 y.o. male for stroke eval.  Reports dizziness starting approx one month ago.  MRI showed R cerebellar lesion c/w infarct.  Currently receiving therapy for dizziness.  Denies prior stroke-like events.  +HTN +DM2.  Denies palpitations.      Also reports memory loss x 1 year; short-term memory; wife has to repeat questions; he forgets tasks that he has done.    ROS:  Review of Systems   All other systems reviewed and are negative.     Past Medical History:   Diagnosis Date    Anxiety     Depression     Diabetes mellitus     Dizziness     Frequent falls     Hypertension     Mixed hyperlipidemia     Nocturnal hypoxemia     Prostate cancer     Unsteady gait      Past Surgical History:   Procedure Laterality Date    HERNIA REPAIR      TONSILLECTOMY       Family History   Problem Relation Age of Onset    Heart disease Mother     Seizures Mother     Coronary artery disease Mother     Diabetes Father     Gout Father     Alcohol abuse Father      Review of patient's allergies indicates:   Allergen Reactions    Antihistamines - alkylamine      Wife states antihistamines make him very nervous and jittery        Current Outpatient Medications:     ALPRAZolam (XANAX) 1 MG tablet, Take 1 mg by mouth 2 (two) times daily., Disp: , Rfl:     amLODIPine (NORVASC) 10 MG tablet, Take 10 mg by mouth., Disp: , Rfl:     aspirin (ECOTRIN) 81 MG EC tablet, Take 81 mg by mouth once daily., Disp: , Rfl:     buPROPion (WELLBUTRIN XL) 150 MG TB24 tablet, Take 150 mg by mouth once daily., Disp: , Rfl:     busPIRone (BUSPAR) 15 MG tablet, Take 15 mg by mouth 2 (two) times daily., Disp: , Rfl:     carvediloL (COREG) 6.25 MG tablet, Take 6.25 mg by mouth 2 (two) times daily with meals., Disp: , Rfl:     fenofibrate (TRICOR) 48 MG tablet, Take 48 mg by mouth., Disp: , Rfl:     metFORMIN (GLUCOPHAGE) 1000 MG tablet, Take 1,000 mg by mouth 2 (two) times daily with meals., Disp: , Rfl:      pantoprazole (PROTONIX) 40 MG tablet, Take 40 mg by mouth once daily., Disp: , Rfl:     primidone (MYSOLINE) 50 MG Tab, Take 50 mg by mouth 2 (two) times a day., Disp: , Rfl:     QUEtiapine (SEROQUEL) 50 MG tablet, Take 50 mg by mouth nightly., Disp: , Rfl:     rosuvastatin (CRESTOR) 40 MG Tab, Take 10 mg by mouth every evening., Disp: , Rfl:     venlafaxine (EFFEXOR-XR) 150 MG Cp24, Take 150 mg by mouth once daily., Disp: , Rfl:   Outpatient Medications Marked as Taking for the 1/31/23 encounter (Office Visit) with Compa Govea MD   Medication Sig Dispense Refill    ALPRAZolam (XANAX) 1 MG tablet Take 1 mg by mouth 2 (two) times daily.      amLODIPine (NORVASC) 10 MG tablet Take 10 mg by mouth.      aspirin (ECOTRIN) 81 MG EC tablet Take 81 mg by mouth once daily.      buPROPion (WELLBUTRIN XL) 150 MG TB24 tablet Take 150 mg by mouth once daily.      busPIRone (BUSPAR) 15 MG tablet Take 15 mg by mouth 2 (two) times daily.      carvediloL (COREG) 6.25 MG tablet Take 6.25 mg by mouth 2 (two) times daily with meals.      fenofibrate (TRICOR) 48 MG tablet Take 48 mg by mouth.      metFORMIN (GLUCOPHAGE) 1000 MG tablet Take 1,000 mg by mouth 2 (two) times daily with meals.      pantoprazole (PROTONIX) 40 MG tablet Take 40 mg by mouth once daily.      primidone (MYSOLINE) 50 MG Tab Take 50 mg by mouth 2 (two) times a day.      QUEtiapine (SEROQUEL) 50 MG tablet Take 50 mg by mouth nightly.      rosuvastatin (CRESTOR) 40 MG Tab Take 10 mg by mouth every evening.      venlafaxine (EFFEXOR-XR) 150 MG Cp24 Take 150 mg by mouth once daily.      [DISCONTINUED] amLODIPine (NORVASC) 5 MG tablet Take 5 mg by mouth once daily.       Social History     Tobacco Use    Smoking status: Every Day     Packs/day: 1.00     Years: 50.00     Pack years: 50.00     Types: Cigarettes    Smokeless tobacco: Never   Substance Use Topics    Alcohol use: Yes     Comment: 2-4 times per month    Drug use: Never         Vitals:    01/31/23 100    BP: 132/80     Gen NAD  HEENT NC/AT  CV RRR, no carotid bruits  Resp clear  GI soft  Ext no C/C/E  Neuro  AAOx4  Speech fluent, appropriate  EOMI, PERRLA, VFF, no papilledema  Normal facial strength, sensation  Tongue and palate midline  Motor 5/5  Sensation intact  DTRs symmetric  Coord mild dysmetria R FNF  Gait Normal    Assessment: R Cerebellar Stroke  Memory Loss    Plan: MRI brain w/ and w/o; MRA head w/o; MRA neck w/ and w/o  Linq monitor if MRA WNL  Neuro Optimizer

## 2023-01-31 NOTE — TELEPHONE ENCOUNTER
----- Message from Keiry Collier sent at 1/31/2023  3:32 PM CST -----  Regarding: MRA Neck W WO Contrast  MRA Neck W WO Contrast needs to be reordered as MRA Neck W Contrast to be scheduled at [facility]      Thank You,Keiry Collier  Centralized Scheduling Dept

## 2023-02-02 ENCOUNTER — TELEPHONE (OUTPATIENT)
Dept: NEUROLOGY | Facility: CLINIC | Age: 68
End: 2023-02-02
Payer: MEDICARE

## 2023-02-02 DIAGNOSIS — I63.9 CEREBELLAR INFARCTION: Primary | ICD-10-CM

## 2023-02-02 DIAGNOSIS — I63.89 OTHER CEREBRAL INFARCTION: ICD-10-CM

## 2023-03-07 ENCOUNTER — APPOINTMENT (OUTPATIENT)
Dept: RADIOLOGY | Facility: HOSPITAL | Age: 68
End: 2023-03-07
Attending: PSYCHIATRY & NEUROLOGY
Payer: MEDICARE

## 2023-03-07 DIAGNOSIS — I63.89 OTHER CEREBRAL INFARCTION: ICD-10-CM

## 2023-03-07 LAB
CREAT SERPL-MCNC: 1.2 MG/DL (ref 0.5–1.4)
SAMPLE: NORMAL

## 2023-03-07 PROCEDURE — 70544 MR ANGIOGRAPHY HEAD W/O DYE: CPT | Mod: TC

## 2023-03-20 ENCOUNTER — OFFICE VISIT (OUTPATIENT)
Dept: NEUROLOGY | Facility: CLINIC | Age: 68
End: 2023-03-20
Payer: MEDICARE

## 2023-03-20 VITALS
DIASTOLIC BLOOD PRESSURE: 72 MMHG | BODY MASS INDEX: 27.32 KG/M2 | WEIGHT: 170 LBS | SYSTOLIC BLOOD PRESSURE: 118 MMHG | HEIGHT: 66 IN

## 2023-03-20 DIAGNOSIS — I63.9 CEREBELLAR INFARCTION: Primary | ICD-10-CM

## 2023-03-20 DIAGNOSIS — R41.3 MEMORY LOSS: ICD-10-CM

## 2023-03-20 DIAGNOSIS — R42 DIZZINESS: ICD-10-CM

## 2023-03-20 PROCEDURE — 99213 OFFICE O/P EST LOW 20 MIN: CPT | Mod: PBBFAC | Performed by: PSYCHIATRY & NEUROLOGY

## 2023-03-20 PROCEDURE — 99999 PR PBB SHADOW E&M-EST. PATIENT-LVL III: CPT | Mod: PBBFAC,,, | Performed by: PSYCHIATRY & NEUROLOGY

## 2023-03-20 PROCEDURE — 99213 PR OFFICE/OUTPT VISIT, EST, LEVL III, 20-29 MIN: ICD-10-PCS | Mod: S$PBB,,, | Performed by: PSYCHIATRY & NEUROLOGY

## 2023-03-20 PROCEDURE — 99213 OFFICE O/P EST LOW 20 MIN: CPT | Mod: S$PBB,,, | Performed by: PSYCHIATRY & NEUROLOGY

## 2023-03-20 PROCEDURE — 99999 PR PBB SHADOW E&M-EST. PATIENT-LVL III: ICD-10-PCS | Mod: PBBFAC,,, | Performed by: PSYCHIATRY & NEUROLOGY

## 2023-03-20 RX ORDER — ALBUTEROL SULFATE 90 UG/1
1 AEROSOL, METERED RESPIRATORY (INHALATION) EVERY 4 HOURS PRN
COMMUNITY
Start: 2023-03-06

## 2023-03-20 RX ORDER — EMPAGLIFLOZIN 10 MG/1
10 TABLET, FILM COATED ORAL
COMMUNITY
Start: 2023-03-02

## 2023-03-20 RX ORDER — BENZONATATE 200 MG/1
200 CAPSULE ORAL 3 TIMES DAILY
COMMUNITY
Start: 2023-03-14

## 2023-03-20 NOTE — PROGRESS NOTES
Neurology Office Visit  Neurology    Erwin Garnica is a 67 y.o. male for f/u.  C/o dizziness.  MRI brain no changes.  MRA mod left M2 stenosis.    ROS:  Review of Systems   All other systems reviewed and are negative.     Past Medical History:   Diagnosis Date    Anxiety     Depression     Diabetes mellitus     Dizziness     Frequent falls     Hypertension     Mixed hyperlipidemia     Nocturnal hypoxemia     Prostate cancer     Unsteady gait      Past Surgical History:   Procedure Laterality Date    HERNIA REPAIR      TONSILLECTOMY       Family History   Problem Relation Age of Onset    Heart disease Mother     Seizures Mother     Coronary artery disease Mother     Diabetes Father     Gout Father     Alcohol abuse Father      Review of patient's allergies indicates:   Allergen Reactions    Antihistamines - alkylamine      Wife states antihistamines make him very nervous and jittery        Current Outpatient Medications:     albuterol (PROVENTIL/VENTOLIN HFA) 90 mcg/actuation inhaler, 1 puff every 4 (four) hours as needed., Disp: , Rfl:     ALPRAZolam (XANAX) 1 MG tablet, Take 1 mg by mouth 2 (two) times daily., Disp: , Rfl:     amLODIPine (NORVASC) 10 MG tablet, Take 10 mg by mouth., Disp: , Rfl:     aspirin (ECOTRIN) 81 MG EC tablet, Take 81 mg by mouth once daily., Disp: , Rfl:     benzonatate (TESSALON) 200 MG capsule, Take 200 mg by mouth 3 (three) times daily., Disp: , Rfl:     buPROPion (WELLBUTRIN XL) 150 MG TB24 tablet, Take 150 mg by mouth once daily., Disp: , Rfl:     busPIRone (BUSPAR) 15 MG tablet, Take 15 mg by mouth 2 (two) times daily., Disp: , Rfl:     carvediloL (COREG) 6.25 MG tablet, Take 6.25 mg by mouth 2 (two) times daily with meals., Disp: , Rfl:     fenofibrate (TRICOR) 48 MG tablet, Take 48 mg by mouth., Disp: , Rfl:     JARDIANCE 10 mg tablet, Take 10 mg by mouth., Disp: , Rfl:     metFORMIN (GLUCOPHAGE) 1000 MG tablet, Take 1,000 mg by mouth 2 (two) times daily with meals., Disp: , Rfl:      pantoprazole (PROTONIX) 40 MG tablet, Take 40 mg by mouth once daily., Disp: , Rfl:     primidone (MYSOLINE) 50 MG Tab, Take 50 mg by mouth 2 (two) times a day., Disp: , Rfl:     QUEtiapine (SEROQUEL) 50 MG tablet, Take 50 mg by mouth nightly., Disp: , Rfl:     rosuvastatin (CRESTOR) 40 MG Tab, Take 10 mg by mouth every evening., Disp: , Rfl:     venlafaxine (EFFEXOR-XR) 150 MG Cp24, Take 150 mg by mouth once daily., Disp: , Rfl:   Outpatient Medications Marked as Taking for the 3/20/23 encounter (Office Visit) with Compa Govea MD   Medication Sig Dispense Refill    albuterol (PROVENTIL/VENTOLIN HFA) 90 mcg/actuation inhaler 1 puff every 4 (four) hours as needed.      ALPRAZolam (XANAX) 1 MG tablet Take 1 mg by mouth 2 (two) times daily.      amLODIPine (NORVASC) 10 MG tablet Take 10 mg by mouth.      aspirin (ECOTRIN) 81 MG EC tablet Take 81 mg by mouth once daily.      benzonatate (TESSALON) 200 MG capsule Take 200 mg by mouth 3 (three) times daily.      buPROPion (WELLBUTRIN XL) 150 MG TB24 tablet Take 150 mg by mouth once daily.      busPIRone (BUSPAR) 15 MG tablet Take 15 mg by mouth 2 (two) times daily.      carvediloL (COREG) 6.25 MG tablet Take 6.25 mg by mouth 2 (two) times daily with meals.      fenofibrate (TRICOR) 48 MG tablet Take 48 mg by mouth.      JARDIANCE 10 mg tablet Take 10 mg by mouth.      metFORMIN (GLUCOPHAGE) 1000 MG tablet Take 1,000 mg by mouth 2 (two) times daily with meals.      pantoprazole (PROTONIX) 40 MG tablet Take 40 mg by mouth once daily.      primidone (MYSOLINE) 50 MG Tab Take 50 mg by mouth 2 (two) times a day.      QUEtiapine (SEROQUEL) 50 MG tablet Take 50 mg by mouth nightly.      rosuvastatin (CRESTOR) 40 MG Tab Take 10 mg by mouth every evening.      venlafaxine (EFFEXOR-XR) 150 MG Cp24 Take 150 mg by mouth once daily.       Social History     Tobacco Use    Smoking status: Every Day     Packs/day: 0.50     Years: 50.00     Pack years: 25.00     Types: Cigarettes     Smokeless tobacco: Never   Substance Use Topics    Alcohol use: Yes     Comment: 2-4 times per month    Drug use: Never         Vitals:    03/20/23 1300   BP: 118/72     Gen NAD  HEENT NC/AT  CV RRR, no carotid bruits  Resp clear  GI soft  Ext no C/C/E  Neuro  AAOx4  Speech fluent, appropriate  EOMI, PERRLA, VFF, no papilledema  Normal facial strength, sensation  Tongue and palate midline  Motor 5/5  Sensation intact  DTRs symmetric  Coord mild dysmetria R FNF  Gait Normal    Assessment: Cerebellar Stroke  Dizziness  Memory Loss    Plan: Recommended reducing antidepressants with dizziness SEs.  They will discuss with prescribing doctor.  Continue Neuro Optimizer

## 2023-06-27 ENCOUNTER — OFFICE VISIT (OUTPATIENT)
Dept: NEUROLOGY | Facility: CLINIC | Age: 68
End: 2023-06-27
Payer: MEDICARE

## 2023-06-27 VITALS — SYSTOLIC BLOOD PRESSURE: 125 MMHG | BODY MASS INDEX: 27.44 KG/M2 | WEIGHT: 170 LBS | DIASTOLIC BLOOD PRESSURE: 86 MMHG

## 2023-06-27 DIAGNOSIS — R42 DIZZINESS: ICD-10-CM

## 2023-06-27 DIAGNOSIS — I63.9 CEREBROVASCULAR ACCIDENT (CVA), UNSPECIFIED MECHANISM: Primary | ICD-10-CM

## 2023-06-27 DIAGNOSIS — R41.89 BRAIN FOG: ICD-10-CM

## 2023-06-27 PROCEDURE — 99999 PR PBB SHADOW E&M-EST. PATIENT-LVL III: ICD-10-PCS | Mod: PBBFAC,,, | Performed by: NURSE PRACTITIONER

## 2023-06-27 PROCEDURE — 99214 PR OFFICE/OUTPT VISIT, EST, LEVL IV, 30-39 MIN: ICD-10-PCS | Mod: S$PBB,,, | Performed by: NURSE PRACTITIONER

## 2023-06-27 PROCEDURE — 99214 OFFICE O/P EST MOD 30 MIN: CPT | Mod: S$PBB,,, | Performed by: NURSE PRACTITIONER

## 2023-06-27 PROCEDURE — 99213 OFFICE O/P EST LOW 20 MIN: CPT | Mod: PBBFAC | Performed by: NURSE PRACTITIONER

## 2023-06-27 PROCEDURE — 99999 PR PBB SHADOW E&M-EST. PATIENT-LVL III: CPT | Mod: PBBFAC,,, | Performed by: NURSE PRACTITIONER

## 2023-06-27 RX ORDER — GLIMEPIRIDE 2 MG/1
2 TABLET ORAL EVERY MORNING
COMMUNITY
Start: 2023-05-31

## 2023-06-27 NOTE — PROGRESS NOTES
Subjective:      Patient ID: Erwin Garnica is a 67 y.o. male.    Chief Complaint:  Stroke (3 mos f/u . Pt states fogginess and dizziness. Denies slurred speech, blurred vision or weakness)      History of Present Illness  Patient presents for follow up. Imaging performed in March 2023; MRI brain, MRA brain, MRA neck all reviewed and normal. Some age related changes. Imaging prior to that  MRI showed R cerebellar lesion c/w infarct. Patient reports still with dizziness and memory fog. Patient was to discuss with prescriber reducing antidepressants to see if this helps with dizziness. Saw Doe Kendrick, psych NP shortly after seeing Dr. Govea. Patient reports he tried to get off his antidepressants but had poor results so he began taking medication again. Patient is taking Primidone for essential tremor twice a day which is controlling tremors for the most part. Reports a fall 2-3 weeks ago. Was trying to put his pants on and he lost his balance and hit his elbow on the tile at his house. Has worked with [facility] about 6 months ago and did not see any improvement in his dizziness or balance.    MRI BRAIN W WO CONTRAST     CLINICAL HISTORY:  Stroke, follow up; Other cerebral infarction     TECHNIQUE:  Multiplanar multisequence MR imaging of the brain was performed without and with contrast.     COMPARISON:  None     FINDINGS:  No intracranial mass or lesion is seen.  No hemorrhage is seen.  No acute infarct is seen.  No diffusion abnormality seen.  There is some cerebral atrophy seen along with some compensatory ventricular dilatation and periventricular white matter change consistent with the patient's age.  Posterior fossa appears normal.  Postcontrast imaging demonstrates no evidence of abnormal enhancement.  No mass is seen.  No vascular enhancement seen.  No dural enhancement is seen.  Calvarium is intact.  There is evidence of pansinusitis.     Impression:     Chronic age related changes      Pansinusitis        Electronically signed by: Tejal Moreland  Date:                                            03/07/2023  Time:                                           15:11    MRA BRAIN WITHOUT CONTRAST     CLINICAL HISTORY:  Stroke, follow up;Other cerebral infarction     TECHNIQUE:  3-D time of flight MR sequences were performed through the [facility] without administration of contrast.     COMPARISON:  None available.     FINDINGS:  The cavernous and supraclinoid portion of the right internal carotid artery are unremarkable.  There is mild narrowing of the supraclinoid portion of the left internal carotid artery.  Right middle cerebral artery, bilateral anterior cerebral arteries and the major branches are widely patent without significant stenosis.  There is mild focal narrowing of the proximal portion of the A1 segment of the left anterior cerebral artery.  There is developmental short M1 segment of the left middle cerebral artery.  The superior division of the left middle cerebral artery shows moderate focal narrowing with unremarkable flow distally.     There is unremarkable flow within the visualized portion of the vertebral arteries.  The basilar artery is again patent.  Flow is seen bilaterally within the posterior cerebral arteries.     Impression:     1.  Left middle cerebral artery superior division focal moderate narrowing.     2.  Otherwise, no hemodynamically significant flow-limiting stenosis identified.        Electronically signed by: Alejandro Ny  Date:                                            03/07/2023  Time:                                           16:06    MRA NECK WITH CONTRAST     CLINICAL HISTORY:  Stroke/TIA, determine embolic source;Cerebral infarction, unspecified     TECHNIQUE:  MR Angiogram study of the neck was performed following intravenous contrast administration.     COMPARISON:  None available     FINDINGS:  The aortic arch is without aneurysmal dilatation.   Brachiocephalic trunk and the visualized subclavian arteries are without significant stenosis.  There are artifacts which degrade images of proximal portion of the right common carotid artery.  Otherwise, there is unremarkable caliber, contours and enhancement of the bilateral common carotid arteries, internal and external carotid arteries without flow limiting stenosis or aneurysmal dilatation.  There is also unremarkable flow bilaterally within the vertebral arteries.     Impression:     No hemodynamically significant flow-limiting stenosis identified.        Electronically signed by: Alejandro Ny  Date:                                            03/07/2023  Time:                                           15:58    Past Medical History:   Diagnosis Date    Anxiety     Depression     Diabetes mellitus     Dizziness     Frequent falls     Hypertension     Mixed hyperlipidemia     Nocturnal hypoxemia     Prostate cancer     Unsteady gait        Past Surgical History:   Procedure Laterality Date    HERNIA REPAIR      TONSILLECTOMY         Family History   Problem Relation Age of Onset    Heart disease Mother     Seizures Mother     Coronary artery disease Mother     Diabetes Father     Gout Father     Alcohol abuse Father        Social History     Socioeconomic History    Marital status:    Tobacco Use    Smoking status: Every Day     Packs/day: 0.50     Years: 50.00     Pack years: 25.00     Types: Cigarettes    Smokeless tobacco: Never   Substance and Sexual Activity    Alcohol use: Yes     Comment: 2-4 times per month    Drug use: Never    Sexual activity: Yes     Partners: Female       Current Outpatient Medications   Medication Sig Dispense Refill    albuterol (PROVENTIL/VENTOLIN HFA) 90 mcg/actuation inhaler 1 puff every 4 (four) hours as needed.      ALPRAZolam (XANAX) 1 MG tablet Take 1 mg by mouth 2 (two) times daily.      amLODIPine (NORVASC) 10 MG tablet Take 10 mg by mouth.      aspirin (ECOTRIN) 81  MG EC tablet Take 81 mg by mouth once daily.      benzonatate (TESSALON) 200 MG capsule Take 200 mg by mouth 3 (three) times daily.      buPROPion (WELLBUTRIN XL) 150 MG TB24 tablet Take 150 mg by mouth once daily.      busPIRone (BUSPAR) 15 MG tablet Take 15 mg by mouth 2 (two) times daily.      carvediloL (COREG) 6.25 MG tablet Take 6.25 mg by mouth 2 (two) times daily with meals.      fenofibrate (TRICOR) 48 MG tablet Take 48 mg by mouth.      glimepiride (AMARYL) 2 MG tablet Take 2 mg by mouth every morning.      JARDIANCE 10 mg tablet Take 10 mg by mouth.      metFORMIN (GLUCOPHAGE) 1000 MG tablet Take 1,000 mg by mouth 2 (two) times daily with meals.      pantoprazole (PROTONIX) 40 MG tablet Take 40 mg by mouth once daily.      primidone (MYSOLINE) 50 MG Tab Take 50 mg by mouth 2 (two) times a day.      QUEtiapine (SEROQUEL) 50 MG tablet Take 50 mg by mouth nightly.      rosuvastatin (CRESTOR) 40 MG Tab Take 10 mg by mouth every evening.      venlafaxine (EFFEXOR-XR) 150 MG Cp24 Take 150 mg by mouth once daily.       No current facility-administered medications for this visit.       Review of patient's allergies indicates:   Allergen Reactions    Antihistamines - alkylamine      Wife states antihistamines make him very nervous and jittery       Vitals:    06/27/23 0932   BP: 125/86         Review of Systems  Review of Systems   Neurological:  Positive for dizziness.   All other systems reviewed and are negative.  Objective:     Neurologic Exam     Mental Status   Oriented to person, place, and time.   Speech: speech is normal   Level of consciousness: alert    Cranial Nerves   Cranial nerves II through XII intact.     Motor Exam   Muscle bulk: normal    Strength   Strength 5/5 throughout.     Sensory Exam   Light touch normal.     Gait, Coordination, and Reflexes     Gait  Gait: normal    Physical Exam  Vitals reviewed.   Cardiovascular:      Rate and Rhythm: Normal rate and regular rhythm.   Pulmonary:       Effort: Pulmonary effort is normal.      Breath sounds: Normal breath sounds.   Neurological:      Mental Status: He is oriented to person, place, and time.      Cranial Nerves: Cranial nerves 2-12 are intact.      Motor: Motor strength is normal.      Gait: Gait is intact.   Psychiatric:         Speech: Speech normal.        Assessment:     1. Cerebrovascular accident (CVA), unspecified mechanism    2. Dizziness    3. Brain fog        Plan:     Recommend taking Primidone only at night to see if this helps alleviate some fatigue/dizziness throughout the day.  Referral to [facility] for balance therapy  Continue ASA/statin  Secondary stroke prevention measures discussed. Education provided on signs and symptoms of stroke; advised to call 9-1-1 with any new onset of numbness, tingling or weakness, difficulty with speech or facial droop.

## 2023-08-03 NOTE — TELEPHONE ENCOUNTER
----- Message from Compa Govea MD sent at 2/1/2023  7:49 PM CST -----  Regarding: FW: Change MRA Neck Order    ----- Message -----  From: Nika Villatoro  Sent: 1/31/2023   4:26 PM CST  To: Compa Govea MD  Subject: Change MRA Neck Order                            Please just change MRA Neck Order to:  MRA Neck With Contrast.   MRA Neck is w/contrast only.      Thanks in Advance .       Sweating Severity Scale: 3- The sweating is barely tolerable and frequently interferes with daily activities Is This A New Presentation, Or A Follow-Up?: Sweating

## 2023-11-14 ENCOUNTER — LAB VISIT (OUTPATIENT)
Dept: LAB | Facility: HOSPITAL | Age: 68
End: 2023-11-14
Attending: INTERNAL MEDICINE
Payer: MEDICARE

## 2023-11-14 DIAGNOSIS — I10 ESSENTIAL HYPERTENSION, MALIGNANT: Primary | ICD-10-CM

## 2023-11-14 LAB — POTASSIUM SERPL-SCNC: 4.8 MMOL/L (ref 3.5–5.1)

## 2023-11-14 PROCEDURE — 84132 ASSAY OF SERUM POTASSIUM: CPT

## 2023-11-14 PROCEDURE — 36415 COLL VENOUS BLD VENIPUNCTURE: CPT

## 2024-01-22 ENCOUNTER — OFFICE VISIT (OUTPATIENT)
Dept: ORTHOPEDICS | Facility: CLINIC | Age: 69
End: 2024-01-22
Payer: MEDICARE

## 2024-01-22 ENCOUNTER — HOSPITAL ENCOUNTER (OUTPATIENT)
Dept: RADIOLOGY | Facility: CLINIC | Age: 69
Discharge: HOME | End: 2024-01-22
Attending: SPECIALIST
Payer: MEDICARE

## 2024-01-22 VITALS
WEIGHT: 172 LBS | HEART RATE: 89 BPM | DIASTOLIC BLOOD PRESSURE: 87 MMHG | BODY MASS INDEX: 27.64 KG/M2 | SYSTOLIC BLOOD PRESSURE: 152 MMHG | HEIGHT: 66 IN

## 2024-01-22 DIAGNOSIS — M25.552 BILATERAL HIP PAIN: ICD-10-CM

## 2024-01-22 DIAGNOSIS — M87.052 AVASCULAR NECROSIS OF LEFT FEMORAL HEAD: Primary | ICD-10-CM

## 2024-01-22 DIAGNOSIS — M87.051 AVASCULAR NECROSIS OF RIGHT FEMORAL HEAD: ICD-10-CM

## 2024-01-22 DIAGNOSIS — M25.551 BILATERAL HIP PAIN: ICD-10-CM

## 2024-01-22 PROCEDURE — 73521 X-RAY EXAM HIPS BI 2 VIEWS: CPT | Mod: ,,, | Performed by: SPECIALIST

## 2024-01-22 PROCEDURE — 99202 OFFICE O/P NEW SF 15 MIN: CPT | Mod: ,,, | Performed by: SPECIALIST

## 2024-01-22 NOTE — PROGRESS NOTES
"  Chief Complaint:   Chief Complaint   Patient presents with    Left Hip - Pain     Pt states he has this B/L hip jaret ongoing for 13 years. Pain aggravates when he is walking or weight bearing and pt describes the pain as "two knives sticking on both sides". Pt states that after he walks a certain distance, he needs assistance.     Right Hip - Pain       History of present illness:    68 year old male presents office today for evaluation of his bilateral hip pain.  His pain has been present for many years but is intermittent.  His pain is only present when walking longer distances.  He describes the pain as a sharp stabbing pain.  It is located in the groin and the lateral hip.  He denies any back pain, numbness or tingling down the legs.  He was told in the past that he had avascular necrosis and bursitis.  This was never further worked up.  He denies injury    Past Medical History:   Diagnosis Date    Anxiety     Depression     Diabetes mellitus     Dizziness     Frequent falls     Hypertension     Mixed hyperlipidemia     Nocturnal hypoxemia     Prostate cancer     Unsteady gait        Past Surgical History:   Procedure Laterality Date    HERNIA REPAIR      TONSILLECTOMY         Current Outpatient Medications   Medication Sig    ALPRAZolam (XANAX) 1 MG tablet Take 1 mg by mouth 2 (two) times daily.    amLODIPine (NORVASC) 10 MG tablet Take 10 mg by mouth.    aspirin (ECOTRIN) 81 MG EC tablet Take 81 mg by mouth once daily.    buPROPion (WELLBUTRIN XL) 150 MG TB24 tablet Take 150 mg by mouth once daily.    busPIRone (BUSPAR) 15 MG tablet Take 15 mg by mouth 2 (two) times daily.    carvediloL (COREG) 6.25 MG tablet Take 6.25 mg by mouth 2 (two) times daily with meals.    fenofibrate (TRICOR) 48 MG tablet Take 48 mg by mouth.    glimepiride (AMARYL) 2 MG tablet Take 2 mg by mouth every morning.    metFORMIN (GLUCOPHAGE) 1000 MG tablet Take 1,000 mg by mouth 2 (two) times daily with meals.    pantoprazole (PROTONIX) " 40 MG tablet Take 40 mg by mouth once daily.    primidone (MYSOLINE) 50 MG Tab Take 50 mg by mouth 2 (two) times a day.    QUEtiapine (SEROQUEL) 50 MG tablet Take 50 mg by mouth nightly.    rosuvastatin (CRESTOR) 40 MG Tab Take 10 mg by mouth every evening.    venlafaxine (EFFEXOR-XR) 150 MG Cp24 Take 150 mg by mouth once daily.    albuterol (PROVENTIL/VENTOLIN HFA) 90 mcg/actuation inhaler 1 puff every 4 (four) hours as needed.    benzonatate (TESSALON) 200 MG capsule Take 200 mg by mouth 3 (three) times daily.    JARDIANCE 10 mg tablet Take 10 mg by mouth.     No current facility-administered medications for this visit.       Review of patient's allergies indicates:   Allergen Reactions    Antihistamines - alkylamine      Wife states antihistamines make him very nervous and jittery     Ozempic [semaglutide] Other (See Comments)       Family History   Problem Relation Age of Onset    Heart disease Mother     Seizures Mother     Coronary artery disease Mother     Diabetes Father     Gout Father     Alcohol abuse Father        Social History     Socioeconomic History    Marital status:    Tobacco Use    Smoking status: Every Day     Current packs/day: 0.50     Average packs/day: 0.5 packs/day for 50.0 years (25.0 ttl pk-yrs)     Types: Cigarettes    Smokeless tobacco: Never   Substance and Sexual Activity    Alcohol use: Yes     Alcohol/week: 2.0 standard drinks of alcohol     Types: 2 Cans of beer per week     Comment: 2-4 times per month    Drug use: Never    Sexual activity: Yes     Partners: Female         Review of Systems:    Constitution:   Denies chills, fever, and sweats.  HENT:   Denies headaches or blurry vision.  Cardiovascular:  Denies chest pain or irregular heart beat.  Respiratory:   Denies cough or shortness of breath.  Gastrointestinal:  Denies abdominal pain, nausea, or vomiting.  Musculoskeletal:   Denies muscle cramps.  Neurological:   Denies dizziness or focal  "weakness.  Psychiatric/Behavior: Normal mental status.  Hematology/Lymph:  Denies bleeding problem or easy bruising/bleeding.  Skin:    Denies rash or suspicious lesions.    Examination:    Vital Signs:    Vitals:    01/22/24 1052 01/22/24 1056   BP:  (!) 152/87   Pulse:  89   Weight: 78 kg (172 lb) 78 kg (172 lb)   Height: 5' 6" (1.676 m) 5' 6" (1.676 m)   PainSc:    8       Body mass index is 27.76 kg/m².    Constitution:   Well-developed, well nourished patient in no acute distress.  Neurological:   Alert and oriented x 3 and cooperative to examination.     Psychiatric/Behavior: Normal mental status.  Respiratory:   No shortness of breath.  Nonlabored breathing  Cardiovascular:           Regular rate and rhythm  Eyes:    Extraoccular muscles intact  Skin:    No scars, rash or suspicious lesions.    Physical Exam:     Right and left hip exam     No signs of edema, erythema, or induration.    Mildly Tender over the greater trochanteric region.    Pain with internal and external rotation    Passive hip abduction 30 degrees   Passive hip flexion 90 degrees   Passive internal rotation 20 degrees   Passive external rotation 45 degrees   No weakness of the left hip was observed. An antalgic gait was observed. limping was noted     xrays    Right and left hip x-ray with two or more views of the AP and lateral aspects were performed.   Impressions   Well-maintained joint spaces of the right and left hip.  There is some increased sclerosis with cystic formation seen in the femoral head concerning for avascular necrosis.  There is no femoral head collapse seen.  No fracture seen.        Assessment:     Avascular necrosis of left femoral head  -     X-Ray Hips Bilateral 2 View Inc AP Pelvis; Future; Expected date: 01/22/2024  -     MRI Pelvis Without Contrast; Future; Expected date: 01/22/2024    Avascular necrosis of right femoral head  -     MRI Pelvis Without Contrast; Future; Expected date: 01/22/2024        Plan:  "     We have discussed exam and x-ray findings with the patient and his wife today.  He does have some increased sclerosis seen in the femoral head concerning for avascular necrosis of the right and left femoral heads.  Recommend MRI of the pelvis to further evaluate for avascular necrosis.  We will see him back in couple weeks falling those test results to discuss further treatment options        Follow up in about 2 weeks (around 2/5/2024).    DISCLAIMER: This note may have been dictated using voice recognition software and may contain grammatical errors.

## 2024-02-28 ENCOUNTER — OFFICE VISIT (OUTPATIENT)
Dept: ORTHOPEDICS | Facility: CLINIC | Age: 69
End: 2024-02-28
Payer: MEDICARE

## 2024-02-28 VITALS — HEART RATE: 83 BPM | SYSTOLIC BLOOD PRESSURE: 137 MMHG | DIASTOLIC BLOOD PRESSURE: 84 MMHG

## 2024-02-28 DIAGNOSIS — M87.051 AVASCULAR NECROSIS OF RIGHT FEMORAL HEAD: ICD-10-CM

## 2024-02-28 DIAGNOSIS — M51.36 LUMBAR DEGENERATIVE DISC DISEASE: ICD-10-CM

## 2024-02-28 DIAGNOSIS — M87.052 AVASCULAR NECROSIS OF LEFT FEMORAL HEAD: Primary | ICD-10-CM

## 2024-02-28 PROCEDURE — 99213 OFFICE O/P EST LOW 20 MIN: CPT | Mod: ,,, | Performed by: SPECIALIST

## 2024-02-28 NOTE — PROGRESS NOTES
Past Medical History:   Diagnosis Date    Anxiety     Depression     Diabetes mellitus     Dizziness     Frequent falls     Hypertension     Mixed hyperlipidemia     Nocturnal hypoxemia     Prostate cancer     Unsteady gait        Past Surgical History:   Procedure Laterality Date    HERNIA REPAIR      TONSILLECTOMY         Current Outpatient Medications   Medication Sig    albuterol (PROVENTIL/VENTOLIN HFA) 90 mcg/actuation inhaler 1 puff every 4 (four) hours as needed.    ALPRAZolam (XANAX) 1 MG tablet Take 1 mg by mouth 2 (two) times daily.    amLODIPine (NORVASC) 10 MG tablet Take 10 mg by mouth.    aspirin (ECOTRIN) 81 MG EC tablet Take 81 mg by mouth once daily.    benzonatate (TESSALON) 200 MG capsule Take 200 mg by mouth 3 (three) times daily.    buPROPion (WELLBUTRIN XL) 150 MG TB24 tablet Take 150 mg by mouth once daily.    busPIRone (BUSPAR) 15 MG tablet Take 15 mg by mouth 2 (two) times daily.    carvediloL (COREG) 6.25 MG tablet Take 6.25 mg by mouth 2 (two) times daily with meals.    fenofibrate (TRICOR) 48 MG tablet Take 48 mg by mouth.    glimepiride (AMARYL) 2 MG tablet Take 2 mg by mouth every morning.    JARDIANCE 10 mg tablet Take 10 mg by mouth.    metFORMIN (GLUCOPHAGE) 1000 MG tablet Take 1,000 mg by mouth 2 (two) times daily with meals.    pantoprazole (PROTONIX) 40 MG tablet Take 40 mg by mouth once daily.    primidone (MYSOLINE) 50 MG Tab Take 50 mg by mouth 2 (two) times a day.    QUEtiapine (SEROQUEL) 50 MG tablet Take 50 mg by mouth nightly.    rosuvastatin (CRESTOR) 40 MG Tab Take 10 mg by mouth every evening.    venlafaxine (EFFEXOR-XR) 150 MG Cp24 Take 150 mg by mouth once daily.     No current facility-administered medications for this visit.       Review of patient's allergies indicates:   Allergen Reactions    Antihistamines - alkylamine      Wife states antihistamines make him very nervous and jittery     Ozempic [semaglutide] Other (See Comments)       Family History   Problem  Relation Age of Onset    Heart disease Mother     Seizures Mother     Coronary artery disease Mother     Diabetes Father     Gout Father     Alcohol abuse Father        Social History     Socioeconomic History    Marital status:    Tobacco Use    Smoking status: Every Day     Current packs/day: 0.50     Average packs/day: 0.5 packs/day for 50.0 years (25.0 ttl pk-yrs)     Types: Cigarettes    Smokeless tobacco: Never   Substance and Sexual Activity    Alcohol use: Yes     Alcohol/week: 2.0 standard drinks of alcohol     Types: 2 Cans of beer per week     Comment: 2-4 times per month    Drug use: Never    Sexual activity: Yes     Partners: Female       Chief Complaint:   Chief Complaint   Patient presents with    Results     Patient states that he is here for MRI results of Pelvis.        Consulting Physician: No ref. provider found    History of present illness:    This is a 68 y.o. year old male who complains of low back pain and buttock pain.  No pain today in the groins.  Pain with prolonged walking.  He is here for MRI results of his hips today.    Review of Systems:    Constitution:   Denies chills, fever, and sweats.  HENT:   Denies headaches or blurry vision.  Cardiovascular:  Denies chest pain or irregular heart beat.  Respiratory:   Denies cough or shortness of breath.  Gastrointestinal:  Denies abdominal pain, nausea, or vomiting.  Musculoskeletal:   Denies muscle cramps.  Neurological:   Denies dizziness or focal weakness.  Psychiatric/Behavior: Normal mental status.  Hematology/Lymph:  Denies bleeding problem or easy bruising/bleeding.  Skin:    Denies rash or suspicious lesions.    Examination:    Vital Signs:    Vitals:    02/28/24 1255   BP: (!) 140/85   Pulse: 85       There is no height or weight on file to calculate BMI.    Constitution:   Well-developed, well nourished patient in no acute distress.  Neurological:   Alert and oriented x 3 and cooperative to examination.      Psychiatric/Behavior: Normal mental status.  Respiratory:   No shortness of breath.non labored breathing.  Cardiovascular: Regular rate and rhythm  Eyes:    Extraoccular muscles intact  Skin:    No scars, rash or suspicious lesions.    Physical Exam:  Bilateral hip exam:   Full active and passive range motion except for the extremes of internal rotation   No tenderness with motion today   No tenderness with weight-bearing today   Neurovascularly intact   Tenderness in the lumbar spine with forward flexion and right and left lateral bend as well extension   Normal neurologic exam    Imaging:  MRI shows stable bilateral femoral head osteonecrosis without subchondral collapse or marrow edema.  No acute fracture.         Assessment: Avascular necrosis of left femoral head    Avascular necrosis of right femoral head    Lumbar degenerative disc disease        Plan:  Physical therapy for lumbar program and hip capsular stretching program and follow up p.r.n.      DISCLAIMER: This note may have been dictated using voice recognition software and may contain grammatical errors.     NOTE: Consult report sent to referring provider via [employer]

## 2024-06-27 ENCOUNTER — OFFICE VISIT (OUTPATIENT)
Dept: NEUROLOGY | Facility: CLINIC | Age: 69
End: 2024-06-27
Payer: MEDICARE

## 2024-06-27 VITALS
DIASTOLIC BLOOD PRESSURE: 79 MMHG | HEIGHT: 66 IN | HEART RATE: 85 BPM | BODY MASS INDEX: 27.63 KG/M2 | SYSTOLIC BLOOD PRESSURE: 107 MMHG | WEIGHT: 171.94 LBS

## 2024-06-27 DIAGNOSIS — I63.9 CEREBELLAR INFARCTION: Primary | ICD-10-CM

## 2024-06-27 PROCEDURE — 99214 OFFICE O/P EST MOD 30 MIN: CPT | Mod: PBBFAC | Performed by: NURSE PRACTITIONER

## 2024-06-27 PROCEDURE — 99999 PR PBB SHADOW E&M-EST. PATIENT-LVL IV: CPT | Mod: PBBFAC,,, | Performed by: NURSE PRACTITIONER

## 2024-06-27 NOTE — PROGRESS NOTES
Subjective:      Patient ID: Erwin Garnica is a 68 y.o. male.    Chief Complaint:  Stroke (1yr f/u. Patient c/o headache located in occipital area that is intermittent. Describes pain as ache. Also c/o dizziness that is intermittent. Denies blurred vision or slurred speech. )      History of Present Illness  Patient presents for follow up. Imaging performed in March 2023; MRI brain, MRA brain, MRA neck all reviewed and normal. Some age related changes. Imaging prior to that  MRI showed R cerebellar lesion c/w infarct. Patient reports still with dizziness and memory fog. Patient reports that he has an intermittent headache at times. States he is still experiencing dizziness. Denies any new onset of numbness, tingling or weakness. Has participated in Fyzical therapy in the past with no improvement in dizziness. Patient reports he is still smoking but working on quitting. Will be seeing a lung specialist soon since recent hospitalization.         Past Medical History:   Diagnosis Date    Anxiety     Depression     Diabetes mellitus     Dizziness     Frequent falls     Hypertension     Mixed hyperlipidemia     Nocturnal hypoxemia     Prostate cancer     Unsteady gait        Past Surgical History:   Procedure Laterality Date    HERNIA REPAIR      TONSILLECTOMY         Family History   Problem Relation Name Age of Onset    Heart disease Mother      Seizures Mother      Coronary artery disease Mother      Diabetes Father      Gout Father      Alcohol abuse Father         Social History     Socioeconomic History    Marital status:    Tobacco Use    Smoking status: Every Day     Current packs/day: 0.50     Average packs/day: 0.5 packs/day for 50.0 years (25.0 ttl pk-yrs)     Types: Cigarettes    Smokeless tobacco: Never   Substance and Sexual Activity    Alcohol use: Yes     Alcohol/week: 2.0 standard drinks of alcohol     Types: 2 Cans of beer per week     Comment: 2-4 times per month    Drug use: Never    Sexual  activity: Yes     Partners: Female     Social Determinants of Health     Transportation Needs: No Transportation Needs (6/12/2024)    Received from [facility] Sub[facility]idi[facility] and Affiliates    LUCIA - Transportation     Lack of Transportation (Medical): No     Lack of Transportation (Non-Medical): No       Current Outpatient Medications   Medication Sig Dispense Refill    albuterol (PROVENTIL/VENTOLIN HFA) 90 mcg/actuation inhaler 1 puff every 4 (four) hours as needed.      ALPRAZolam (XANAX) 1 MG tablet Take 1 mg by mouth 2 (two) times daily.      amLODIPine (NORVASC) 10 MG tablet Take 10 mg by mouth.      aspirin (ECOTRIN) 81 MG EC tablet Take 81 mg by mouth once daily.      benzonatate (TESSALON) 200 MG capsule Take 200 mg by mouth 3 (three) times daily.      buPROPion (WELLBUTRIN) 100 MG tablet Take 150 mg by mouth once daily.      busPIRone (BUSPAR) 15 MG tablet Take 15 mg by mouth 2 (two) times daily.      carvediloL (COREG) 6.25 MG tablet Take 6.25 mg by mouth 2 (two) times daily with meals.      fenofibrate (TRICOR) 48 MG tablet Take 48 mg by mouth.      glimepiride (AMARYL) 2 MG tablet Take 2 mg by mouth every morning.      JARDIANCE 10 mg tablet Take 10 mg by mouth.      metFORMIN (GLUCOPHAGE) 1000 MG tablet Take 1,000 mg by mouth 2 (two) times daily with meals.      pantoprazole (PROTONIX) 40 MG tablet Take 40 mg by mouth once daily.      primidone (MYSOLINE) 50 MG Tab Take 50 mg by mouth 2 (two) times a day.      QUEtiapine (SEROQUEL) 50 MG tablet Take 50 mg by mouth nightly.      rosuvastatin (CRESTOR) 40 MG Tab Take 10 mg by mouth every evening.      venlafaxine (EFFEXOR-XR) 150 MG Cp24 Take 150 mg by mouth once daily.       No current facility-administered medications for this visit.       Review of patient's allergies indicates:   Allergen Reactions    Antihistamines - alkylamine      Wife states antihistamines make him very nervous and jittery      "Ozempic [semaglutide] Other (See Comments)        Vitals:    06/27/24 0908   BP: 107/79   BP Location: Left arm   Patient Position: Sitting   Pulse: 85   Weight: 78 kg (171 lb 15.3 oz)   Height: 5' 6" (1.676 m)        Review of Systems  12 point ROS performed and negative unless otherwise documented in HPI  Objective:     Neurologic Exam      Mental Status   Oriented to person, place, and time.   Speech: speech is normal   Level of consciousness: alert     Cranial Nerves   Cranial nerves II through XII intact.      Motor Exam   Muscle bulk: normal     Strength   Strength 5/5 throughout.      Sensory Exam   Light touch normal.      Gait, Coordination, and Reflexes      Gait  Gait: normal     Physical Exam  Vitals reviewed.   Cardiovascular:      Rate and Rhythm: Normal rate and regular rhythm.   Pulmonary:      Effort: Pulmonary effort is normal.      Breath sounds: Normal breath sounds.   Neurological:      Mental Status: He is oriented to person, place, and time.      Cranial Nerves: Cranial nerves 2-12 are intact.      Motor: Motor strength is normal.      Gait: Gait is intact.   Psychiatric:         Speech: Speech normal.     Assessment:     1. Cerebellar infarction        Plan:     Patient on multiple psych meds that may increase dizziness  History of cerebellar stroke also cause of dizziness.  Advised to get up slowly to help avoid falls from orthostasis  Continue statin/ASA  Continue management of DM per PCP; goal LDL <70  Recommend smoking cessation  Secondary stroke prevention measures discussed. Education provided on signs and symptoms of stroke; advised to call 9-1-1 with any new onset of numbness, tingling or weakness, difficulty with speech or facial droop.    Patient may follow up prn  "

## 2024-09-10 ENCOUNTER — LAB VISIT (OUTPATIENT)
Dept: LAB | Facility: HOSPITAL | Age: 69
End: 2024-09-10
Attending: UROLOGY
Payer: MEDICARE

## 2024-09-10 DIAGNOSIS — N13.8 BPH WITH URINARY OBSTRUCTION: ICD-10-CM

## 2024-09-10 DIAGNOSIS — Z01.818 OTHER SPECIFIED PRE-OPERATIVE EXAMINATION: Primary | ICD-10-CM

## 2024-09-10 DIAGNOSIS — Z01.818 OTHER SPECIFIED PRE-OPERATIVE EXAMINATION: ICD-10-CM

## 2024-09-10 DIAGNOSIS — N40.1 BPH WITH URINARY OBSTRUCTION: ICD-10-CM

## 2024-09-10 LAB
ALBUMIN SERPL-MCNC: 4 G/DL (ref 3.4–4.8)
ALBUMIN/GLOB SERPL: 1.7 RATIO (ref 1.1–2)
ALP SERPL-CCNC: 70 UNIT/L (ref 40–150)
ALT SERPL-CCNC: 8 UNIT/L (ref 0–55)
ANION GAP SERPL CALC-SCNC: 6 MEQ/L
AST SERPL-CCNC: 7 UNIT/L (ref 5–34)
BACTERIA #/AREA URNS AUTO: ABNORMAL /HPF
BASOPHILS # BLD AUTO: 0.05 X10(3)/MCL
BASOPHILS NFR BLD AUTO: 0.5 %
BILIRUB SERPL-MCNC: 0.4 MG/DL
BILIRUB UR QL STRIP.AUTO: NEGATIVE
BUN SERPL-MCNC: 13 MG/DL (ref 8.4–25.7)
CALCIUM SERPL-MCNC: 9.3 MG/DL (ref 8.8–10)
CHLORIDE SERPL-SCNC: 107 MMOL/L (ref 98–107)
CLARITY UR: CLEAR
CO2 SERPL-SCNC: 28 MMOL/L (ref 23–31)
COLOR UR AUTO: YELLOW
CREAT SERPL-MCNC: 1.2 MG/DL (ref 0.73–1.18)
CREAT/UREA NIT SERPL: 11
EOSINOPHIL # BLD AUTO: 0.17 X10(3)/MCL (ref 0–0.9)
EOSINOPHIL NFR BLD AUTO: 1.7 %
ERYTHROCYTE [DISTWIDTH] IN BLOOD BY AUTOMATED COUNT: 13.9 % (ref 11.5–17)
GFR SERPLBLD CREATININE-BSD FMLA CKD-EPI: >60 ML/MIN/1.73/M2
GLOBULIN SER-MCNC: 2.4 GM/DL (ref 2.4–3.5)
GLUCOSE SERPL-MCNC: 192 MG/DL (ref 82–115)
GLUCOSE UR QL STRIP: NORMAL
HCT VFR BLD AUTO: 39.7 % (ref 42–52)
HGB BLD-MCNC: 13.3 G/DL (ref 14–18)
HGB UR QL STRIP: NEGATIVE
IMM GRANULOCYTES # BLD AUTO: 0.12 X10(3)/MCL (ref 0–0.04)
IMM GRANULOCYTES NFR BLD AUTO: 1.2 %
KETONES UR QL STRIP: NEGATIVE
LEUKOCYTE ESTERASE UR QL STRIP: NEGATIVE
LYMPHOCYTES # BLD AUTO: 2.25 X10(3)/MCL (ref 0.6–4.6)
LYMPHOCYTES NFR BLD AUTO: 22.1 %
MCH RBC QN AUTO: 28.7 PG (ref 27–31)
MCHC RBC AUTO-ENTMCNC: 33.5 G/DL (ref 33–36)
MCV RBC AUTO: 85.7 FL (ref 80–94)
MONOCYTES # BLD AUTO: 0.99 X10(3)/MCL (ref 0.1–1.3)
MONOCYTES NFR BLD AUTO: 9.7 %
MUCOUS THREADS URNS QL MICRO: ABNORMAL /LPF
NEUTROPHILS # BLD AUTO: 6.58 X10(3)/MCL (ref 2.1–9.2)
NEUTROPHILS NFR BLD AUTO: 64.8 %
NITRITE UR QL STRIP: NEGATIVE
NRBC BLD AUTO-RTO: 0 %
OHS QRS DURATION: 82 MS
OHS QTC CALCULATION: 479 MS
PH UR STRIP: 5.5 [PH]
PLATELET # BLD AUTO: 270 X10(3)/MCL (ref 130–400)
PMV BLD AUTO: 9.5 FL (ref 7.4–10.4)
POTASSIUM SERPL-SCNC: 4.5 MMOL/L (ref 3.5–5.1)
PROT SERPL-MCNC: 6.4 GM/DL (ref 5.8–7.6)
PROT UR QL STRIP: ABNORMAL
RBC # BLD AUTO: 4.63 X10(6)/MCL (ref 4.7–6.1)
RBC #/AREA URNS AUTO: ABNORMAL /HPF
SODIUM SERPL-SCNC: 141 MMOL/L (ref 136–145)
SP GR UR STRIP.AUTO: 1.02 (ref 1–1.03)
SQUAMOUS #/AREA URNS LPF: ABNORMAL /HPF
UROBILINOGEN UR STRIP-ACNC: NORMAL
WBC # BLD AUTO: 10.16 X10(3)/MCL (ref 4.5–11.5)
WBC #/AREA URNS AUTO: ABNORMAL /HPF

## 2024-09-10 PROCEDURE — 93010 ELECTROCARDIOGRAM REPORT: CPT | Mod: ,,, | Performed by: INTERNAL MEDICINE

## 2024-09-10 PROCEDURE — 36415 COLL VENOUS BLD VENIPUNCTURE: CPT

## 2024-09-10 PROCEDURE — 93005 ELECTROCARDIOGRAM TRACING: CPT

## 2024-09-12 LAB — BACTERIA UR CULT: NO GROWTH

## 2024-09-12 RX ORDER — BUDESONIDE, GLYCOPYRROLATE, AND FORMOTEROL FUMARATE 160; 9; 4.8 UG/1; UG/1; UG/1
1 AEROSOL, METERED RESPIRATORY (INHALATION) 2 TIMES DAILY
COMMUNITY

## 2024-09-12 NOTE — DISCHARGE INSTRUCTIONS
Patient Education       Cystoscopy Discharge Instructions       What care is needed at home?   Take a warm bath or use a warm wet washcloth over the opening to the urethra. This will help to ease any pain. Do this as needed.  Drink 6 to 8 glasses of water a day and 3 to 4 glasses in the first few hours after the procedure to flush out your bladder and reduce irritation.  You may see some blood in your urine for a few days. This is normal.  Empty your bladder as soon as you feel the need to. Don't delay going to the bathroom. It stretches and weakens the bladder.    What follow-up care is needed?   Be sure to keep your follow up visit.  If you had a biopsy, talk with your doctor about the results.    Will physical activity be limited?   Talk to your doctor about when you may go back to your normal activities like work, driving, or sex.    What problems could happen?   Bleeding  Infection  Injury to the bladder and urethra  Discomfort in the urethra area  Burning sensation for a short time  Upset stomach    When do I need to call the doctor?   Signs of infection. These include a fever of 100.4°F (38°C) or higher, chills, pain with passing urine.  Pain that does not go away even with drugs or that lasts longer than 2 days  Too much blood in your urine  Passing large dime-sized clots  Cloudy urine  Little or no urine or not able to pass urine  Abdominal pain and nausea     Patient Education       Urolift Discharge Instructions        What care is needed at home?   You need to limit your activity and rest after the procedure for 1 to 2 days. Your doctor will tell you when you can return to your normal activity level.  You need to drink 6 to 8 glasses of water each day. Drinking water is very important if your urine becomes red. Red urine means your treated area is bleeding.  Avoid straining or lifting over 10 pounds (4.5 kg) until your doctor tells you it is all right.  What follow-up care is needed?   Your doctor may  ask you to make visits to the office to check on your progress. Be sure to keep your visits.  Will physical activity be limited?   You need to limit your sexual activity after the procedure for about 4 to 6 weeks.  What problems could happen?   Infection  Pain or burning when passing urine  Blood in the urine  Decreased urine flow  Urolift needs removal  Stones in the bladder  Problems with MRI scans in the future  When do I need to call the doctor?   Signs of infection such as a fever of 100.4°F (38°C) or higher, chills, or pain  Very bad pain in the belly that does not respond to pain relievers  Not able to drink or eat  Problems with your urine, such as thick, yellow, green, or milky drainage; burning feeling when you pass urine; little urine or no urine at all; urine smells bad  More blood in your urine  Tube seems blocked or you see grit or stones in your urine

## 2024-09-18 ENCOUNTER — ANESTHESIA EVENT (OUTPATIENT)
Dept: SURGERY | Facility: HOSPITAL | Age: 69
End: 2024-09-18
Payer: MEDICARE

## 2024-09-19 ENCOUNTER — ANESTHESIA (OUTPATIENT)
Dept: SURGERY | Facility: HOSPITAL | Age: 69
End: 2024-09-19
Payer: MEDICARE

## 2024-09-19 ENCOUNTER — HOSPITAL ENCOUNTER (OUTPATIENT)
Facility: HOSPITAL | Age: 69
Discharge: HOME | End: 2024-09-19
Attending: UROLOGY | Admitting: UROLOGY
Payer: MEDICARE

## 2024-09-19 PROBLEM — N13.8 ENLARGED PROSTATE WITH URINARY OBSTRUCTION: Status: ACTIVE | Noted: 2024-09-19

## 2024-09-19 PROBLEM — N40.1 ENLARGED PROSTATE WITH URINARY OBSTRUCTION: Status: RESOLVED | Noted: 2024-09-19 | Resolved: 2024-09-19

## 2024-09-19 PROBLEM — N13.8 ENLARGED PROSTATE WITH URINARY OBSTRUCTION: Status: RESOLVED | Noted: 2024-09-19 | Resolved: 2024-09-19

## 2024-09-19 PROBLEM — N40.1 ENLARGED PROSTATE WITH URINARY OBSTRUCTION: Status: ACTIVE | Noted: 2024-09-19

## 2024-09-19 LAB
POCT GLUCOSE: 136 MG/DL (ref 70–110)
POCT GLUCOSE: 138 MG/DL (ref 70–110)

## 2024-09-19 PROCEDURE — 71000033 HC RECOVERY, INTIAL HOUR: Performed by: UROLOGY

## 2024-09-19 PROCEDURE — 25000003 PHARM REV CODE 250: Performed by: ANESTHESIOLOGY

## 2024-09-19 PROCEDURE — 25000003 PHARM REV CODE 250

## 2024-09-19 PROCEDURE — 63600175 PHARM REV CODE 636 W HCPCS: Performed by: ANESTHESIOLOGY

## 2024-09-19 PROCEDURE — 63600175 PHARM REV CODE 636 W HCPCS

## 2024-09-19 PROCEDURE — 63600175 PHARM REV CODE 636 W HCPCS: Performed by: UROLOGY

## 2024-09-19 PROCEDURE — L8699 PROSTHETIC IMPLANT NOS: HCPCS | Performed by: UROLOGY

## 2024-09-19 PROCEDURE — 36000707: Performed by: UROLOGY

## 2024-09-19 PROCEDURE — 71000015 HC POSTOP RECOV 1ST HR: Performed by: UROLOGY

## 2024-09-19 PROCEDURE — 71000016 HC POSTOP RECOV ADDL HR: Performed by: UROLOGY

## 2024-09-19 PROCEDURE — 25000003 PHARM REV CODE 250: Performed by: UROLOGY

## 2024-09-19 PROCEDURE — 37000008 HC ANESTHESIA 1ST 15 MINUTES: Performed by: UROLOGY

## 2024-09-19 PROCEDURE — C1769 GUIDE WIRE: HCPCS | Performed by: UROLOGY

## 2024-09-19 PROCEDURE — 36000706: Performed by: UROLOGY

## 2024-09-19 PROCEDURE — 37000009 HC ANESTHESIA EA ADD 15 MINS: Performed by: UROLOGY

## 2024-09-19 DEVICE — UROLIFT 2 IMPLANT CARTRIDGE
Type: IMPLANTABLE DEVICE | Site: PROSTATE | Status: FUNCTIONAL
Brand: UROLIFT

## 2024-09-19 RX ORDER — SODIUM CHLORIDE, SODIUM GLUCONATE, SODIUM ACETATE, POTASSIUM CHLORIDE AND MAGNESIUM CHLORIDE 30; 37; 368; 526; 502 MG/100ML; MG/100ML; MG/100ML; MG/100ML; MG/100ML
INJECTION, SOLUTION INTRAVENOUS CONTINUOUS
Status: DISCONTINUED | OUTPATIENT
Start: 2024-09-19 | End: 2024-09-19 | Stop reason: HOSPADM

## 2024-09-19 RX ORDER — MEPERIDINE HYDROCHLORIDE 25 MG/ML
INJECTION INTRAMUSCULAR; INTRAVENOUS; SUBCUTANEOUS
Status: DISCONTINUED | OUTPATIENT
Start: 2024-09-19 | End: 2024-09-19

## 2024-09-19 RX ORDER — MIDAZOLAM HYDROCHLORIDE 2 MG/2ML
1 INJECTION, SOLUTION INTRAMUSCULAR; INTRAVENOUS ONCE AS NEEDED
Status: COMPLETED | OUTPATIENT
Start: 2024-09-19 | End: 2024-09-19

## 2024-09-19 RX ORDER — MEPERIDINE HYDROCHLORIDE 25 MG/ML
12.5 INJECTION INTRAMUSCULAR; INTRAVENOUS; SUBCUTANEOUS EVERY 10 MIN PRN
Status: DISCONTINUED | OUTPATIENT
Start: 2024-09-19 | End: 2024-09-19 | Stop reason: HOSPADM

## 2024-09-19 RX ORDER — CEFTRIAXONE 1 G/1
1 INJECTION, POWDER, FOR SOLUTION INTRAMUSCULAR; INTRAVENOUS ONCE
Status: COMPLETED | OUTPATIENT
Start: 2024-09-19 | End: 2024-09-19

## 2024-09-19 RX ORDER — FENTANYL CITRATE 50 UG/ML
INJECTION, SOLUTION INTRAMUSCULAR; INTRAVENOUS
Status: DISCONTINUED | OUTPATIENT
Start: 2024-09-19 | End: 2024-09-19

## 2024-09-19 RX ORDER — ONDANSETRON 4 MG/1
8 TABLET, ORALLY DISINTEGRATING ORAL EVERY 6 HOURS PRN
Status: DISCONTINUED | OUTPATIENT
Start: 2024-09-19 | End: 2024-09-19 | Stop reason: HOSPADM

## 2024-09-19 RX ORDER — METHOCARBAMOL 100 MG/ML
1000 INJECTION, SOLUTION INTRAMUSCULAR; INTRAVENOUS ONCE AS NEEDED
Status: COMPLETED | OUTPATIENT
Start: 2024-09-19 | End: 2024-09-19

## 2024-09-19 RX ORDER — HYOSCYAMINE SULFATE 0.12 MG/1
0.12 TABLET SUBLINGUAL ONCE AS NEEDED
Status: DISCONTINUED | OUTPATIENT
Start: 2024-09-19 | End: 2024-09-19

## 2024-09-19 RX ORDER — ACETAMINOPHEN 10 MG/ML
1000 INJECTION, SOLUTION INTRAVENOUS ONCE
Status: COMPLETED | OUTPATIENT
Start: 2024-09-19 | End: 2024-09-19

## 2024-09-19 RX ORDER — HYDROMORPHONE HYDROCHLORIDE 2 MG/ML
0.2 INJECTION, SOLUTION INTRAMUSCULAR; INTRAVENOUS; SUBCUTANEOUS EVERY 5 MIN PRN
Status: DISCONTINUED | OUTPATIENT
Start: 2024-09-19 | End: 2024-09-19 | Stop reason: HOSPADM

## 2024-09-19 RX ORDER — PROPOFOL 10 MG/ML
VIAL (ML) INTRAVENOUS
Status: DISCONTINUED | OUTPATIENT
Start: 2024-09-19 | End: 2024-09-19

## 2024-09-19 RX ORDER — KETOROLAC TROMETHAMINE 30 MG/ML
15 INJECTION, SOLUTION INTRAMUSCULAR; INTRAVENOUS ONCE
Status: COMPLETED | OUTPATIENT
Start: 2024-09-19 | End: 2024-09-19

## 2024-09-19 RX ORDER — HYOSCYAMINE SULFATE 0.12 MG/1
0.12 TABLET SUBLINGUAL EVERY 4 HOURS PRN
Status: DISCONTINUED | OUTPATIENT
Start: 2024-09-19 | End: 2024-09-19 | Stop reason: HOSPADM

## 2024-09-19 RX ORDER — CELECOXIB 200 MG/1
200 CAPSULE ORAL ONCE
Status: COMPLETED | OUTPATIENT
Start: 2024-09-19 | End: 2024-09-19

## 2024-09-19 RX ORDER — IPRATROPIUM BROMIDE AND ALBUTEROL SULFATE 2.5; .5 MG/3ML; MG/3ML
3 SOLUTION RESPIRATORY (INHALATION)
Status: DISCONTINUED | OUTPATIENT
Start: 2024-09-19 | End: 2024-09-19 | Stop reason: HOSPADM

## 2024-09-19 RX ORDER — ONDANSETRON HYDROCHLORIDE 2 MG/ML
4 INJECTION, SOLUTION INTRAVENOUS DAILY PRN
Status: DISCONTINUED | OUTPATIENT
Start: 2024-09-19 | End: 2024-09-19 | Stop reason: HOSPADM

## 2024-09-19 RX ORDER — GLUCAGON 1 MG
1 KIT INJECTION
Status: DISCONTINUED | OUTPATIENT
Start: 2024-09-19 | End: 2024-09-19 | Stop reason: HOSPADM

## 2024-09-19 RX ORDER — LIDOCAINE HYDROCHLORIDE 10 MG/ML
INJECTION, SOLUTION EPIDURAL; INFILTRATION; INTRACAUDAL; PERINEURAL
Status: DISCONTINUED | OUTPATIENT
Start: 2024-09-19 | End: 2024-09-19

## 2024-09-19 RX ORDER — MIDAZOLAM HYDROCHLORIDE 2 MG/2ML
INJECTION, SOLUTION INTRAMUSCULAR; INTRAVENOUS
Status: COMPLETED
Start: 2024-09-19 | End: 2024-09-19

## 2024-09-19 RX ORDER — HYDROMORPHONE HYDROCHLORIDE 2 MG/ML
0.4 INJECTION, SOLUTION INTRAMUSCULAR; INTRAVENOUS; SUBCUTANEOUS EVERY 5 MIN PRN
Status: DISCONTINUED | OUTPATIENT
Start: 2024-09-19 | End: 2024-09-19 | Stop reason: HOSPADM

## 2024-09-19 RX ORDER — LIDOCAINE HYDROCHLORIDE 10 MG/ML
1 INJECTION, SOLUTION EPIDURAL; INFILTRATION; INTRACAUDAL; PERINEURAL ONCE
Status: DISCONTINUED | OUTPATIENT
Start: 2024-09-19 | End: 2024-09-19 | Stop reason: HOSPADM

## 2024-09-19 RX ORDER — DEXMEDETOMIDINE HYDROCHLORIDE 100 UG/ML
INJECTION, SOLUTION INTRAVENOUS
Status: DISCONTINUED | OUTPATIENT
Start: 2024-09-19 | End: 2024-09-19

## 2024-09-19 RX ORDER — ONDANSETRON HYDROCHLORIDE 2 MG/ML
INJECTION, SOLUTION INTRAMUSCULAR; INTRAVENOUS
Status: DISCONTINUED | OUTPATIENT
Start: 2024-09-19 | End: 2024-09-19

## 2024-09-19 RX ORDER — PROCHLORPERAZINE EDISYLATE 5 MG/ML
5 INJECTION INTRAMUSCULAR; INTRAVENOUS EVERY 30 MIN PRN
Status: DISCONTINUED | OUTPATIENT
Start: 2024-09-19 | End: 2024-09-19 | Stop reason: HOSPADM

## 2024-09-19 RX ORDER — DEXAMETHASONE SODIUM PHOSPHATE 4 MG/ML
INJECTION, SOLUTION INTRA-ARTICULAR; INTRALESIONAL; INTRAMUSCULAR; INTRAVENOUS; SOFT TISSUE
Status: DISCONTINUED | OUTPATIENT
Start: 2024-09-19 | End: 2024-09-19

## 2024-09-19 RX ADMIN — SODIUM CHLORIDE, POTASSIUM CHLORIDE, SODIUM LACTATE AND CALCIUM CHLORIDE: 600; 310; 30; 20 INJECTION, SOLUTION INTRAVENOUS at 03:09

## 2024-09-19 RX ADMIN — METHOCARBAMOL 1000 MG: 100 INJECTION INTRAMUSCULAR; INTRAVENOUS at 04:09

## 2024-09-19 RX ADMIN — DEXAMETHASONE SODIUM PHOSPHATE 4 MG: 4 INJECTION, SOLUTION INTRA-ARTICULAR; INTRALESIONAL; INTRAMUSCULAR; INTRAVENOUS; SOFT TISSUE at 03:09

## 2024-09-19 RX ADMIN — LIDOCAINE HYDROCHLORIDE 50 MG: 10 INJECTION, SOLUTION EPIDURAL; INFILTRATION; INTRACAUDAL; PERINEURAL at 03:09

## 2024-09-19 RX ADMIN — DEXMEDETOMIDINE 6 MCG: 200 INJECTION, SOLUTION INTRAVENOUS at 04:09

## 2024-09-19 RX ADMIN — MIDAZOLAM HYDROCHLORIDE 1 MG: 1 INJECTION, SOLUTION INTRAMUSCULAR; INTRAVENOUS at 03:09

## 2024-09-19 RX ADMIN — PROPOFOL 200 MG: 10 INJECTION, EMULSION INTRAVENOUS at 03:09

## 2024-09-19 RX ADMIN — MIDAZOLAM HYDROCHLORIDE 1 MG: 2 INJECTION, SOLUTION INTRAMUSCULAR; INTRAVENOUS at 03:09

## 2024-09-19 RX ADMIN — ONDANSETRON 4 MG: 2 INJECTION INTRAMUSCULAR; INTRAVENOUS at 03:09

## 2024-09-19 RX ADMIN — CEFTRIAXONE SODIUM 1 G: 1 INJECTION, POWDER, FOR SOLUTION INTRAMUSCULAR; INTRAVENOUS at 03:09

## 2024-09-19 RX ADMIN — CELECOXIB 200 MG: 200 CAPSULE ORAL at 01:09

## 2024-09-19 RX ADMIN — FENTANYL CITRATE 25 MCG: 50 INJECTION, SOLUTION INTRAMUSCULAR; INTRAVENOUS at 04:09

## 2024-09-19 RX ADMIN — FENTANYL CITRATE 25 MCG: 50 INJECTION, SOLUTION INTRAMUSCULAR; INTRAVENOUS at 03:09

## 2024-09-19 RX ADMIN — MEPERIDINE HYDROCHLORIDE 50 MG: 25 INJECTION INTRAMUSCULAR; INTRAVENOUS; SUBCUTANEOUS at 04:09

## 2024-09-19 RX ADMIN — HYOSCYAMINE SULFATE 0.12 MG: 0.12 TABLET SUBLINGUAL at 05:09

## 2024-09-19 RX ADMIN — ACETAMINOPHEN 1000 MG: 10 INJECTION, SOLUTION INTRAVENOUS at 01:09

## 2024-09-19 RX ADMIN — KETOROLAC TROMETHAMINE 15 MG: 30 INJECTION, SOLUTION INTRAMUSCULAR; INTRAVENOUS at 05:09

## 2024-09-19 NOTE — ANESTHESIA POSTPROCEDURE EVALUATION
Anesthesia Post Evaluation    Patient: Erwin Garnica    Procedure(s) Performed: Procedure(s) (LRB):  CYSTOSCOPY, WITH INSERTION OF UROLIFT IMPLANT (N/A)    Final Anesthesia Type: general      Patient location during evaluation: PACU  Patient participation: No - Unable to Participate, Sedation  Level of consciousness: sedated  Post-procedure vital signs: reviewed and stable  Pain management: adequate  Airway patency: patent  NEAL mitigation strategies: Multimodal analgesia, Verification of full reversal of neuromuscular block and Postoperative administration of CPAP, nasopharyngeal airway, or oral appliance in the postanesthesia care unit (PACU)  PONV status at discharge: No PONV  Anesthetic complications: no      Cardiovascular status: blood pressure returned to baseline and stable  Respiratory status: face mask, unassisted and spontaneous ventilation  Hydration status: euvolemic  Follow-up not needed.              Vitals Value Taken Time   /62 09/19/24 1636   Temp 36.2 09/19/24 1637   Pulse 72 09/19/24 1636   Resp 13 09/19/24 1636   SpO2 94 % 09/19/24 1636   Vitals shown include unfiled device data.      No case tracking events are documented in the log.      Pain/Nora Score: Pain Rating Prior to Med Admin: 0 (9/19/2024  1:57 PM)

## 2024-09-19 NOTE — DISCHARGE SUMMARY
[facility] Surgical - Periop Services  Discharge Note  Short Stay    Procedure(s) (LRB):  CYSTOSCOPY, WITH INSERTION OF UROLIFT IMPLANT (N/A)      OUTCOME: Patient tolerated treatment/procedure well without complication and is now ready for discharge.    DISPOSITION: Home or Self Care    FINAL DIAGNOSIS:  Enlarged prostate with urinary obstruction    FOLLOWUP: In clinic    DISCHARGE INSTRUCTIONS:  No discharge procedures on file.     TIME SPENT ON DISCHARGE: 10 minutes

## 2024-09-19 NOTE — ANESTHESIA PREPROCEDURE EVALUATION
"                                                                                                             09/19/2024  Erwin Garnica is a 68 y.o., male with COPD (hospitalized a few months ago and treated with nebs and antibiotics.  Patient reports no recent usage of rescue inhaler)  and prior CVA presents as an outpatient for cystoscopy with UroLift implant.  Recent EKG normal sinus rhythm with nonspecific T-wave changes.  Light breakfast at 5:00 a.m.    Last 3 sets of Vitals        6/27/2024     9:08 AM 9/12/2024     3:46 PM 9/19/2024     2:12 PM   Vitals - 1 value per visit   SYSTOLIC 107  135   DIASTOLIC 79  81   Pulse 85  87   Temp   36.9 °C (98.5 °F)   Resp   18   SPO2   98 %   Weight (lb) 171.96 165    Weight (kg) 78 74.844    Height 5' 6" (1.676 m) 5' 7" (1.702 m)    BMI (Calculated) 27.8 25.8        Lab Results   Component Value Date    WBC 10.16 09/10/2024    HGB 13.3 (L) 09/10/2024    HCT 39.7 (L) 09/10/2024    MCV 85.7 09/10/2024     09/10/2024          BMP  Lab Results   Component Value Date     09/10/2024    K 4.5 09/10/2024     09/10/2024    CO2 28 09/10/2024    BUN 13.0 09/10/2024    CREATININE 1.20 (H) 09/10/2024    CALCIUM 9.3 09/10/2024    ANIONGAP 9 06/14/2024    ESTGFRAFRICA 85 06/14/2024    EGFRNONAA >60 04/25/2022          Pre-op Assessment    I have reviewed the Patient Summary Reports.    I have reviewed the NPO Status.   I have reviewed the Medications.     Review of Systems  Anesthesia Hx:               Denies Personal Hx of Anesthesia complications.                    Social:  Non-Smoker       Hematology/Oncology:       -- Anemia:                                  Cardiovascular:     Hypertension                Functional Capacity good / => 4 METS                         Pulmonary:   COPD                     Renal/:  Chronic Renal Disease, CKD                Neurological:   CVA                                    Endocrine:  Diabetes, type 2               Physical " Exam  General: Well nourished, Cooperative, Alert and Oriented    Airway:  Mallampati: II   Mouth Opening: Normal  TM Distance: Normal  Tongue: Normal  Neck ROM: Normal ROM    Dental:  Intact    Chest/Lungs:  Clear to auscultation, Normal Respiratory Rate    Heart:  Rate: Normal  Rhythm: Regular Rhythm        Anesthesia Plan  Type of Anesthesia, risks & benefits discussed:    Anesthesia Type: Gen Supraglottic Airway  Intra-op Monitoring Plan: Standard ASA Monitors  Post Op Pain Control Plan: multimodal analgesia and IV/PO Opioids PRN  Induction:  IV  Airway Plan: Direct  Informed Consent: Informed consent signed with the Patient and all parties understand the risks and agree with anesthesia plan.  All questions answered.   ASA Score: 3  Day of Surgery Review of History & Physical: H&P Update referred to the surgeon/provider.    Ready For Surgery From Anesthesia Perspective.     .

## 2024-09-19 NOTE — OP NOTE
Erwin Garnica   1955   [id number]     Date of Procedure: 9/19/2024              PreOP Dx:  Pre-Op Diagnosis Codes:     * Enlarged prostate with urinary obstruction [N40.1, N13.8]     Post Op Dx: Post-Op Diagnosis Codes:     * Enlarged prostate with urinary obstruction [N40.1, N13.8]       Procedure:  Procedure(s):  CYSTOSCOPY, WITH INSERTION OF UROLIFT IMPLANT X6      Surgeon:  Atif Burch MD      EBL: none      Procedure:   After consent was obtained, the patient was taken into the operating room after receiving a preop dose of antibiotics.  I passed the rigid cystoscope through the urethra into the bladder.  His bladder was moderately trabeculated I came back in the prostatic urethra and placed a pair of implants 2 cm from the bladder neck.  I did have to bone strikes.  I placed an additional 2 implants at the level of the verumontanum and 2 additional implants in the mid prostatic urethra.  Following this his prostatic urethra was wide open.  He tolerated the procedure well.  I placed an 18 Bulgarian catheter in place.  There were no apparent complications he was extubated and brought to recovery in good condition

## 2024-09-19 NOTE — ANESTHESIA PROCEDURE NOTES
Intubation    Date/Time: 9/19/2024 3:53 PM    Performed by: Moriah Clark CRNA  Authorized by: Ravinder Guzmán MD    Intubation:     Induction:  Intravenous    Intubated:  Postinduction    Mask Ventilation:  Easy mask    Attempts:  1    Attempted By:  CRNA    Difficult Airway Encountered?: No      Airway Device:  Supraglottic airway/LMA    Airway Device Size:  4.0    Style/Cuff Inflation:  Cuffed (inflated to minimal occlusive pressure)    Secured at:  The lips    Placement Verified By:  Capnometry    Complicating Factors:  None    Findings Post-Intubation:  BS equal bilateral

## 2024-09-20 VITALS
HEART RATE: 78 BPM | OXYGEN SATURATION: 93 % | HEIGHT: 67 IN | SYSTOLIC BLOOD PRESSURE: 149 MMHG | DIASTOLIC BLOOD PRESSURE: 93 MMHG | BODY MASS INDEX: 26.64 KG/M2 | WEIGHT: 169.75 LBS | TEMPERATURE: 97 F | RESPIRATION RATE: 12 BRPM

## 2024-09-20 NOTE — PLAN OF CARE
Problem: Diabetes Comorbidity  Goal: Blood Glucose Level Within Targeted Range  Outcome: Met     Problem: Hypertension Acute  Goal: Blood Pressure Within Desired Range  Outcome: Met     Problem: Anxiety Signs/Symptoms  Goal: Optimized Energy Level (Anxiety Signs/Symptoms)  Outcome: Met  Goal: Optimized Cognitive Function (Anxiety Signs/Symptoms)  Outcome: Met  Goal: Improved Mood Symptoms (Anxiety Signs/Symptoms)  Outcome: Met  Goal: Improved Sleep (Anxiety Signs/Symptoms)  Outcome: Met  Goal: Enhanced Social, Occupational or Functional Skills (Anxiety Signs/Symptoms)  Outcome: Met  Goal: Improved Somatic Symptoms (Anxiety Signs/Symptoms)  Outcome: Met     Problem: Pain Acute  Goal: Optimal Pain Control and Function  Outcome: Met     Problem: Fall Injury Risk  Goal: Absence of Fall and Fall-Related Injury  Outcome: Met     Problem: Infection  Goal: Absence of Infection Signs and Symptoms  Outcome: Met     Problem: Adult Inpatient Plan of Care  Goal: Plan of Care Review  Outcome: Met  Goal: Patient-Specific Goal (Individualized)  Outcome: Met  Goal: Absence of Hospital-Acquired Illness or Injury  Outcome: Met  Goal: Optimal Comfort and Wellbeing  Outcome: Met  Goal: Readiness for Transition of Care  Outcome: Met

## 2025-06-27 ENCOUNTER — HOSPITAL ENCOUNTER (OUTPATIENT)
Dept: RADIOLOGY | Facility: HOSPITAL | Age: 70
Discharge: HOME | End: 2025-06-27
Attending: INTERNAL MEDICINE
Payer: MEDICARE

## 2025-06-27 DIAGNOSIS — Z87.891 SMOKING HX: ICD-10-CM

## 2025-06-27 PROCEDURE — 71271 CT THORAX LUNG CANCER SCR C-: CPT | Mod: TC

## 2025-08-08 ENCOUNTER — OFFICE VISIT (OUTPATIENT)
Dept: ORTHOPEDICS | Facility: CLINIC | Age: 70
End: 2025-08-08
Payer: MEDICARE

## 2025-08-08 ENCOUNTER — HOSPITAL ENCOUNTER (OUTPATIENT)
Dept: RADIOLOGY | Facility: CLINIC | Age: 70
Discharge: HOME | End: 2025-08-08
Attending: PHYSICIAN ASSISTANT
Payer: MEDICARE

## 2025-08-08 VITALS
HEART RATE: 91 BPM | SYSTOLIC BLOOD PRESSURE: 105 MMHG | DIASTOLIC BLOOD PRESSURE: 52 MMHG | WEIGHT: 171.63 LBS | HEIGHT: 67 IN | BODY MASS INDEX: 26.94 KG/M2

## 2025-08-08 DIAGNOSIS — M76.01 GLUTEAL TENDINITIS OF BOTH BUTTOCKS: ICD-10-CM

## 2025-08-08 DIAGNOSIS — M87.052 AVASCULAR NECROSIS OF LEFT FEMORAL HEAD: ICD-10-CM

## 2025-08-08 DIAGNOSIS — M70.61 TROCHANTERIC BURSITIS OF BOTH HIPS: ICD-10-CM

## 2025-08-08 DIAGNOSIS — M87.051 AVASCULAR NECROSIS OF RIGHT FEMORAL HEAD: ICD-10-CM

## 2025-08-08 DIAGNOSIS — M76.02 GLUTEAL TENDINITIS OF BOTH BUTTOCKS: ICD-10-CM

## 2025-08-08 DIAGNOSIS — M70.62 TROCHANTERIC BURSITIS OF BOTH HIPS: ICD-10-CM

## 2025-08-08 DIAGNOSIS — M87.051 AVASCULAR NECROSIS OF RIGHT FEMORAL HEAD: Primary | ICD-10-CM

## 2025-08-08 PROCEDURE — 73521 X-RAY EXAM HIPS BI 2 VIEWS: CPT | Mod: ,,, | Performed by: PHYSICIAN ASSISTANT

## 2025-08-08 RX ORDER — PAROXETINE 20 MG/1
20 TABLET, FILM COATED ORAL EVERY MORNING
COMMUNITY

## 2025-08-08 RX ORDER — BETAMETHASONE SODIUM PHOSPHATE AND BETAMETHASONE ACETATE 3; 3 MG/ML; MG/ML
12 INJECTION, SUSPENSION INTRA-ARTICULAR; INTRALESIONAL; INTRAMUSCULAR; SOFT TISSUE
Status: DISCONTINUED | OUTPATIENT
Start: 2025-08-08 | End: 2025-08-08 | Stop reason: HOSPADM

## 2025-08-08 RX ORDER — ZOLPIDEM TARTRATE 10 MG/1
10 TABLET ORAL NIGHTLY
COMMUNITY

## 2025-08-08 RX ORDER — BUPROPION HYDROCHLORIDE 100 MG/1
100 TABLET, EXTENDED RELEASE ORAL
COMMUNITY

## 2025-08-08 RX ORDER — LIDOCAINE HYDROCHLORIDE 10 MG/ML
2 INJECTION, SOLUTION INFILTRATION; PERINEURAL
Status: DISCONTINUED | OUTPATIENT
Start: 2025-08-08 | End: 2025-08-08 | Stop reason: HOSPADM

## 2025-08-08 RX ORDER — OXYBUTYNIN CHLORIDE 5 MG/1
5 TABLET ORAL 2 TIMES DAILY
COMMUNITY

## 2025-08-08 RX ADMIN — LIDOCAINE HYDROCHLORIDE 2 ML: 10 INJECTION, SOLUTION INFILTRATION; PERINEURAL at 09:08

## 2025-08-08 RX ADMIN — BETAMETHASONE SODIUM PHOSPHATE AND BETAMETHASONE ACETATE 12 MG: 3; 3 INJECTION, SUSPENSION INTRA-ARTICULAR; INTRALESIONAL; INTRAMUSCULAR; SOFT TISSUE at 09:08

## 2025-08-08 NOTE — PROCEDURES
Large Joint Aspiration/Injection: bilateral greater trochanteric bursa    Date/Time: 8/8/2025 9:30 AM    Performed by: Hay Tai PA  Authorized by: Hay Tai PA    Site marked: the procedure site was marked    Timeout: prior to procedure the correct patient, procedure, and site was verified    Prep: patient was prepped and draped in usual sterile fashion    Approach:  Lateral  Location:  Hip  Laterality:  Bilateral  Site:  Bilateral greater trochanteric bursa  Medications (Right):  12 mg betamethasone acetate-betamethasone sodium phosphate 6 mg/mL; 2 mL LIDOcaine HCL 10 mg/ml (1%) 10 mg/mL (1 %)  Medications (Left):  12 mg betamethasone acetate-betamethasone sodium phosphate 6 mg/mL; 2 mL LIDOcaine HCL 10 mg/ml (1%) 10 mg/mL (1 %)  Patient tolerance:  Patient tolerated the procedure well with no immediate complications

## 2025-08-08 NOTE — PROGRESS NOTES
Chief Complaint:   Chief Complaint   Patient presents with    Hip Pain     Ongoing for over 5 year and has gotten worse. Pain only occurs when walking. Denies any numbness or tingling. Is taking tylenol which helps some of the pain.       History of present illness:      History of Present Illness    CHIEF COMPLAINT:  - Bilateral hip pain    HPI:  Mr. 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 Pain is localized to the sides of the hips rather than the groin area, with the left hip slightly more affected. His condition has gradually worsened since the last visit, with no drastic changes. He does not provide a specific pain score but indicates that activities are becoming more challenging.    A recent cruise experience in April highlighted his limitations. Loading and walking the long plank to board and disembark the boat was more challenging than anticipated. His wife, who accompanied him to the appointment, described this as eye-opening. They desire to travel more in [place] but recognize the need to address his hip issues to fully enjoy their plans.    He mentions no recent medication changes or other treatments attempted between visits. He is left-hand dominant.    WORK STATUS:  - Retired          Past Medical History:   Diagnosis Date    Anxiety     Avascular necrosis of bone of hip     Bilat    COPD (chronic obstructive pulmonary disease)     Depression     Diabetes mellitus     Dizziness     Frequent falls     Hypertension     Mixed hyperlipidemia     Nocturnal hypoxemia     Prostate cancer     Stroke     states was over 20 years ago    Unsteady gait        Past Surgical History:   Procedure Laterality Date    CYSTOSCOPY WITH INSERTION OF MINIMALLY INVASIVE IMPLANT TO ENLARGE PROSTATIC URETHRA N/A 9/19/2024    Procedure: CYSTOSCOPY, WITH INSERTION OF UROLIFT IMPLANT;  Surgeon: Atif Burch MD;  Location: [facility]  OR;  Service: Urology;  Laterality: N/A;    HERNIA REPAIR      TONSILLECTOMY         Current Outpatient Medications   Medication Sig    ALPRAZolam (XANAX) 1 MG tablet Take 1 mg by mouth 2 (two) times daily.    amLODIPine (NORVASC) 10 MG tablet Take 10 mg by mouth.    aspirin (ECOTRIN) 81 MG EC tablet Take 81 mg by mouth once daily.    buPROPion (WELLBUTRIN SR) 100 MG TBSR 12 hr tablet Take 100 mg by mouth.    busPIRone (BUSPAR) 15 MG tablet Take 15 mg by mouth 2 (two) times daily.    carvediloL (COREG) 6.25 MG tablet Take 6.25 mg by mouth 2 (two) times daily with meals.    fenofibrate (TRICOR) 48 MG tablet Take 48 mg by mouth.    glimepiride (AMARYL) 2 MG tablet Take 2 mg by mouth every morning.    metFORMIN (GLUCOPHAGE) 1000 MG tablet Take 1,000 mg by mouth 2 (two) times daily with meals.    oxybutynin (DITROPAN) 5 MG Tab Take 5 mg by mouth 2 (two) times daily.    pantoprazole (PROTONIX) 40 MG tablet Take 40 mg by mouth once daily.    paroxetine (PAXIL) 20 MG tablet Take 20 mg by mouth every morning.    primidone (MYSOLINE) 50 MG Tab Take 50 mg by mouth 2 (two) times a day.    QUEtiapine (SEROQUEL) 50 MG tablet Take 50 mg by mouth nightly.    rosuvastatin (CRESTOR) 40 MG Tab Take 10 mg by mouth every evening.    zolpidem (AMBIEN) 10 mg Tab Take 10 mg by mouth every evening.    albuterol (PROVENTIL/VENTOLIN HFA) 90 mcg/actuation inhaler 1 puff every 4 (four) hours as needed. (Patient not taking: Reported on 8/8/2025)    benzonatate (TESSALON) 200 MG capsule Take 200 mg by mouth 3 (three) times daily. (Patient not taking: Reported on 8/8/2025)    BREZTRI AEROSPHERE 160-9-4.8 mcg/actuation HFAA Take 1 puff by mouth 2 (two) times a day. (Patient not taking: Reported on 8/8/2025)     No current facility-administered medications for this visit.       Review of patient's allergies indicates:   Allergen Reactions    Antihistamines - alkylamine      Wife states antihistamines make him very nervous and jittery     Ozempic  "[semaglutide] Other (See Comments)       Family History   Problem Relation Name Age of Onset    Heart disease Mother      Seizures Mother      Coronary artery disease Mother      Diabetes Father      Gout Father      Alcohol abuse Father         Social History[1]      Review of Systems:    Constitution:   Denies chills, fever, and sweats.  HENT:   Denies headaches or blurry vision.  Cardiovascular:  Denies chest pain or irregular heart beat.  Respiratory:   Denies cough or shortness of breath.  Gastrointestinal:  Denies abdominal pain, nausea, or vomiting.  Musculoskeletal:   Denies muscle cramps.  Neurological:   Denies dizziness or focal weakness.  Psychiatric/Behavior: Normal mental status.  Hematology/Lymph:  Denies bleeding problem or easy bruising/bleeding.  Skin:    Denies rash or suspicious lesions.    Examination:    Vital Signs:    Vitals:    08/08/25 0926   BP: (!) 105/52   Pulse: 91   Weight: 77.8 kg (171 lb 9.6 oz)   Height: 5' 7" (1.702 m)       Body mass index is 26.88 kg/m².    Constitution:   Well-developed, well nourished patient in no acute distress.  Neurological:   Alert and oriented x 3 and cooperative to examination.     Psychiatric/Behavior: Normal mental status.  Respiratory:   No shortness of breath.  Nonlabored breathing  Cardiovascular:           Regular rate and rhythm  Eyes:    Extraoccular muscles intact  Skin:    No scars, rash or suspicious lesions.    Physical Exam:       Right and left hip exam     No signs of edema, erythema, or induration.    Tender over the greater trochanteric region.    Pain with internal and external rotation    Passive hip abduction 30 degrees   Passive hip flexion 90 degrees   Passive internal rotation 20 degrees   Passive external rotation 45 degrees   No weakness of the left hip was observed. An antalgic gait was observed. limping was noted     xrays    Right and left hip x-ray with two or more views of the AP and lateral aspects were performed. "   Impressions   Well-maintained joint spaces of the right and left hip.  There is some increased sclerosis with cystic formation seen in the femoral head consistent r avascular necrosis.  There is no femoral head collapse seen.  No fracture seen.  No interval change since last radiographs        Assessment:     Avascular necrosis of right femoral head  -     X-Ray Hips Bilateral 2 View Inc AP Pelvis; Future; Expected date: 08/08/2025    Avascular necrosis of left femoral head  -     X-Ray Hips Bilateral 2 View Inc AP Pelvis; Future; Expected date: 08/08/2025    Gluteal tendinitis of both buttocks  -     Large Joint Aspiration/Injection: bilateral greater trochanteric bursa  -     Ambulatory Referral/Consult to Physical Therapy; Future; Expected date: 08/15/2025    Trochanteric bursitis of both hips  -     Large Joint Aspiration/Injection: bilateral greater trochanteric bursa  -     Ambulatory Referral/Consult to Physical Therapy; Future; Expected date: 08/15/2025        Plan:      Assessment & Plan  I have discussed exam and x-ray findings with the patient and his wife today.  He does have a history of AVN of both femoral heads but this is stable compared to last radiographs back in January 2024.  His symptoms are all lateral today.  I recommend a bursal injection both hips today.  Patient tolerated procedure well.  Physical therapy for gluteal strengthening and ITB band stretching.  Follow up in 3 months for repeat evaluation      PROCEDURES:  - Discussed steroid injections as a potential treatment option for hip pain, which could help alleviate pain in the gluteal muscles and surrounding area.              Follow up in about 3 months (around 11/8/2025).    This note was generated with the assistance of ambient listening technology. Verbal consent was obtained by the patient and accompanying visitor(s) for the recording of patient appointment to facilitate this note. I attest to having reviewed and edited the  generated note for accuracy, though some syntax or spelling errors may persist. Please contact the author of this note for any clarification.       DISCLAIMER: This note may have been dictated using voice recognition software and may contain grammatical errors.          [1]   Social History  Socioeconomic History    Marital status:    Tobacco Use    Smoking status: Every Day     Current packs/day: 0.50     Average packs/day: 0.5 packs/day for 50.0 years (25.0 ttl pk-yrs)     Types: Cigarettes    Smokeless tobacco: Never    Tobacco comments:     Smokes 1/2 pack per day   Substance and Sexual Activity    Alcohol use: Yes     Alcohol/week: 2.0 standard drinks of alcohol     Types: 2 Cans of beer per week     Comment: weekend    Drug use: Never    Sexual activity: Yes     Partners: Female     Social Drivers of Health     Transportation Needs: No Transportation Needs (6/12/2024)    Received from [facility] Subsidi[facility] and Affili[facility]    PRAPARE - Transportation     Lack of Transportation (Medical): No     Lack of Transportation (Non-Medical): No

## (undated) DEVICE — GUIDEWIRE UROSTREAM STR .035IN

## (undated) DEVICE — SOL IRR WATER STRL 3000 ML

## (undated) DEVICE — SUPPORT ULNA NERVE PROTECTOR

## (undated) DEVICE — Device

## (undated) DEVICE — SPONGE COTTON TRAY 4X4IN

## (undated) DEVICE — DRAPE UND BUTT W/POUCH 40X44IN

## (undated) DEVICE — JELLY SURGILUBE LUBE TUBE 2OZ

## (undated) DEVICE — KIT UROLIFT 2 CARTRIDGE HANDLE

## (undated) DEVICE — BAG DRAIN ANTI REFLUX 2000ML

## (undated) DEVICE — CATH SELECTSILICON FOL 16F 10M

## (undated) DEVICE — NDL SYR 10ML 18X1.5 LL BLUNT

## (undated) DEVICE — GLOVE SIGNATURE MICRO LTX 7.5